# Patient Record
Sex: FEMALE | Race: BLACK OR AFRICAN AMERICAN | NOT HISPANIC OR LATINO | Employment: FULL TIME | URBAN - METROPOLITAN AREA
[De-identification: names, ages, dates, MRNs, and addresses within clinical notes are randomized per-mention and may not be internally consistent; named-entity substitution may affect disease eponyms.]

---

## 2022-12-29 ENCOUNTER — HOSPITAL ENCOUNTER (OUTPATIENT)
Dept: DATA CONVERSION | Facility: HOSPITAL | Age: 22
Discharge: HOME | End: 2022-12-29
Attending: STUDENT IN AN ORGANIZED HEALTH CARE EDUCATION/TRAINING PROGRAM

## 2022-12-29 DIAGNOSIS — Z3A.09 9 WEEKS GESTATION OF PREGNANCY (HHS-HCC): ICD-10-CM

## 2022-12-29 DIAGNOSIS — R11.2 NAUSEA WITH VOMITING, UNSPECIFIED: ICD-10-CM

## 2022-12-29 DIAGNOSIS — R10.30 LOWER ABDOMINAL PAIN, UNSPECIFIED: ICD-10-CM

## 2022-12-29 DIAGNOSIS — O00.101 RIGHT TUBAL PREGNANCY WITHOUT INTRAUTERINE PREGNANCY (HHS-HCC): ICD-10-CM

## 2022-12-29 DIAGNOSIS — O46.91: ICD-10-CM

## 2022-12-29 DIAGNOSIS — Z20.822 CONTACT WITH AND (SUSPECTED) EXPOSURE TO COVID-19: ICD-10-CM

## 2022-12-29 LAB
ABO GROUP (TYPE) IN BLOOD: NORMAL
ACTIVATED PARTIAL THROMBOPLASTIN TIME IN PPP BY COAGULATION ASSAY: 26 SEC (ref 26–39)
ALANINE AMINOTRANSFERASE (SGPT) (U/L) IN SER/PLAS: 5 U/L (ref 7–45)
ALANINE AMINOTRANSFERASE (SGPT) (U/L) IN SER/PLAS: 6 U/L (ref 7–45)
ALBUMIN (G/DL) IN SER/PLAS: 3.5 G/DL (ref 3.4–5)
ALBUMIN (G/DL) IN SER/PLAS: 4.4 G/DL (ref 3.4–5)
ALBUMIN (G/DL) IN SER/PLAS: 4.4 G/DL (ref 3.4–5)
ALKALINE PHOSPHATASE (U/L) IN SER/PLAS: 62 U/L (ref 33–110)
ALKALINE PHOSPHATASE (U/L) IN SER/PLAS: 74 U/L (ref 33–110)
ANION GAP IN SER/PLAS: 12 MMOL/L (ref 10–20)
ANION GAP IN SER/PLAS: 14 MMOL/L (ref 10–20)
ANTIBODY SCREEN: NORMAL
ASPARTATE AMINOTRANSFERASE (SGOT) (U/L) IN SER/PLAS: 13 U/L (ref 9–39)
ASPARTATE AMINOTRANSFERASE (SGOT) (U/L) IN SER/PLAS: 17 U/L (ref 9–39)
BASOPHILS (10*3/UL) IN BLOOD BY AUTOMATED COUNT: 0.05 X10E9/L (ref 0–0.1)
BASOPHILS/100 LEUKOCYTES IN BLOOD BY AUTOMATED COUNT: 0.3 % (ref 0–2)
BILIRUBIN DIRECT (MG/DL) IN SER/PLAS: 0.1 MG/DL (ref 0–0.3)
BILIRUBIN TOTAL (MG/DL) IN SER/PLAS: 0.8 MG/DL (ref 0–1.2)
BILIRUBIN TOTAL (MG/DL) IN SER/PLAS: 1.1 MG/DL (ref 0–1.2)
CALCIUM (MG/DL) IN SER/PLAS: 8 MG/DL (ref 8.6–10.6)
CALCIUM (MG/DL) IN SER/PLAS: 9.1 MG/DL (ref 8.6–10.6)
CARBON DIOXIDE, TOTAL (MMOL/L) IN SER/PLAS: 20 MMOL/L (ref 21–32)
CARBON DIOXIDE, TOTAL (MMOL/L) IN SER/PLAS: 20 MMOL/L (ref 21–32)
CHLORIDE (MMOL/L) IN SER/PLAS: 106 MMOL/L (ref 98–107)
CHLORIDE (MMOL/L) IN SER/PLAS: 107 MMOL/L (ref 98–107)
CHORIOGONADOTROPIN (MIU/ML) IN SER/PLAS: 1934 IU/L
CREATININE (MG/DL) IN SER/PLAS: 0.66 MG/DL (ref 0.5–1.05)
CREATININE (MG/DL) IN SER/PLAS: 0.69 MG/DL (ref 0.5–1.05)
EOSINOPHILS (10*3/UL) IN BLOOD BY AUTOMATED COUNT: 0 X10E9/L (ref 0–0.7)
EOSINOPHILS/100 LEUKOCYTES IN BLOOD BY AUTOMATED COUNT: 0 % (ref 0–6)
ERYTHROCYTE DISTRIBUTION WIDTH (RATIO) BY AUTOMATED COUNT: 12.6 % (ref 11.5–14.5)
ERYTHROCYTE DISTRIBUTION WIDTH (RATIO) BY AUTOMATED COUNT: 12.7 % (ref 11.5–14.5)
ERYTHROCYTE MEAN CORPUSCULAR HEMOGLOBIN CONCENTRATION (G/DL) BY AUTOMATED: 32.7 G/DL (ref 32–36)
ERYTHROCYTE MEAN CORPUSCULAR HEMOGLOBIN CONCENTRATION (G/DL) BY AUTOMATED: 33.6 G/DL (ref 32–36)
ERYTHROCYTE MEAN CORPUSCULAR VOLUME (FL) BY AUTOMATED COUNT: 90 FL (ref 80–100)
ERYTHROCYTE MEAN CORPUSCULAR VOLUME (FL) BY AUTOMATED COUNT: 91 FL (ref 80–100)
ERYTHROCYTES (10*6/UL) IN BLOOD BY AUTOMATED COUNT: 3.45 X10E12/L (ref 4–5.2)
ERYTHROCYTES (10*6/UL) IN BLOOD BY AUTOMATED COUNT: 3.74 X10E12/L (ref 4–5.2)
FLU A RESULT: NOT DETECTED
FLU B RESULT: NOT DETECTED
GFR FEMALE: >90 ML/MIN/1.73M2
GFR FEMALE: >90 ML/MIN/1.73M2
GLUCOSE (MG/DL) IN SER/PLAS: 149 MG/DL (ref 74–99)
GLUCOSE (MG/DL) IN SER/PLAS: 166 MG/DL (ref 74–99)
HEMATOCRIT (%) IN BLOOD BY AUTOMATED COUNT: 31.5 % (ref 36–46)
HEMATOCRIT (%) IN BLOOD BY AUTOMATED COUNT: 33.6 % (ref 36–46)
HEMOGLOBIN (G/DL) IN BLOOD: 10.3 G/DL (ref 12–16)
HEMOGLOBIN (G/DL) IN BLOOD: 11.3 G/DL (ref 12–16)
IMMATURE GRANULOCYTES/100 LEUKOCYTES IN BLOOD BY AUTOMATED COUNT: 0.4 % (ref 0–0.9)
INR IN PPP BY COAGULATION ASSAY: 1.1 (ref 0.9–1.1)
LEUKOCYTES (10*3/UL) IN BLOOD BY AUTOMATED COUNT: 19.5 X10E9/L (ref 4.4–11.3)
LEUKOCYTES (10*3/UL) IN BLOOD BY AUTOMATED COUNT: 20.7 X10E9/L (ref 4.4–11.3)
LYMPHOCYTES (10*3/UL) IN BLOOD BY AUTOMATED COUNT: 0.71 X10E9/L (ref 1.2–4.8)
LYMPHOCYTES/100 LEUKOCYTES IN BLOOD BY AUTOMATED COUNT: 3.6 % (ref 13–44)
MONOCYTES (10*3/UL) IN BLOOD BY AUTOMATED COUNT: 0.69 X10E9/L (ref 0.1–1)
MONOCYTES/100 LEUKOCYTES IN BLOOD BY AUTOMATED COUNT: 3.5 % (ref 2–10)
NEUTROPHILS (10*3/UL) IN BLOOD BY AUTOMATED COUNT: 17.99 X10E9/L (ref 1.2–7.7)
NEUTROPHILS/100 LEUKOCYTES IN BLOOD BY AUTOMATED COUNT: 92.2 % (ref 40–80)
NRBC (PER 100 WBCS) BY AUTOMATED COUNT: 0 /100 WBC (ref 0–0)
NRBC (PER 100 WBCS) BY AUTOMATED COUNT: 0 /100 WBC (ref 0–0)
PHOSPHATE (MG/DL) IN SER/PLAS: 4.2 MG/DL (ref 2.5–4.9)
PLATELETS (10*3/UL) IN BLOOD AUTOMATED COUNT: 238 X10E9/L (ref 150–450)
PLATELETS (10*3/UL) IN BLOOD AUTOMATED COUNT: 322 X10E9/L (ref 150–450)
POTASSIUM (MMOL/L) IN SER/PLAS: 4.7 MMOL/L (ref 3.5–5.3)
POTASSIUM (MMOL/L) IN SER/PLAS: 4.9 MMOL/L (ref 3.5–5.3)
PROTEIN TOTAL: 5.5 G/DL (ref 6.4–8.2)
PROTEIN TOTAL: 7.3 G/DL (ref 6.4–8.2)
PROTHROMBIN TIME (PT) IN PPP BY COAGULATION ASSAY: 13.2 SEC (ref 9.8–13.4)
RH FACTOR: NORMAL
SARS-COV-2 RESULT: NOT DETECTED
SODIUM (MMOL/L) IN SER/PLAS: 134 MMOL/L (ref 136–145)
SODIUM (MMOL/L) IN SER/PLAS: 135 MMOL/L (ref 136–145)
UREA NITROGEN (MG/DL) IN SER/PLAS: 11 MG/DL (ref 6–23)
UREA NITROGEN (MG/DL) IN SER/PLAS: 9 MG/DL (ref 6–23)

## 2022-12-30 LAB
COMPLETE PATHOLOGY REPORT: NORMAL
CONVERTED CLINICAL DIAGNOSIS-HISTORY: NORMAL
CONVERTED FINAL DIAGNOSIS: NORMAL
CONVERTED FINAL REPORT PDF LINK TO COPY AND PASTE: NORMAL
CONVERTED GROSS DESCRIPTION: NORMAL
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
## 2023-05-10 LAB
CHLAMYDIA TRACH., AMPLIFIED: NEGATIVE
N. GONORRHEA, AMPLIFIED: NEGATIVE
URINE CULTURE: NORMAL

## 2023-05-14 ENCOUNTER — HOSPITAL ENCOUNTER (OUTPATIENT)
Dept: DATA CONVERSION | Facility: HOSPITAL | Age: 23
End: 2023-05-15
Attending: EMERGENCY MEDICINE
Payer: MEDICAID

## 2023-05-14 DIAGNOSIS — R10.31 RIGHT LOWER QUADRANT PAIN: ICD-10-CM

## 2023-05-14 DIAGNOSIS — O26.851 SPOTTING COMPLICATING PREGNANCY, FIRST TRIMESTER (HHS-HCC): ICD-10-CM

## 2023-05-14 DIAGNOSIS — O00.80 OTHER ECTOPIC PREGNANCY WITHOUT INTRAUTERINE PREGNANCY (HHS-HCC): ICD-10-CM

## 2023-05-14 DIAGNOSIS — Z87.59 PERSONAL HISTORY OF OTHER COMPLICATIONS OF PREGNANCY, CHILDBIRTH AND THE PUERPERIUM: ICD-10-CM

## 2023-05-14 DIAGNOSIS — Z90.721 ACQUIRED ABSENCE OF OVARIES, UNILATERAL: ICD-10-CM

## 2023-05-14 DIAGNOSIS — O26.891 OTHER SPECIFIED PREGNANCY RELATED CONDITIONS, FIRST TRIMESTER (HHS-HCC): ICD-10-CM

## 2023-05-14 DIAGNOSIS — O20.9 HEMORRHAGE IN EARLY PREGNANCY, UNSPECIFIED (HHS-HCC): ICD-10-CM

## 2023-05-14 DIAGNOSIS — Z87.42 PERSONAL HISTORY OF OTHER DISEASES OF THE FEMALE GENITAL TRACT: ICD-10-CM

## 2023-05-14 LAB
ABO GROUP (TYPE) IN BLOOD: NORMAL
ACTIVATED PARTIAL THROMBOPLASTIN TIME IN PPP BY COAGULATION ASSAY: NORMAL
ALANINE AMINOTRANSFERASE (SGPT) (U/L) IN SER/PLAS: 6 U/L (ref 7–45)
ALBUMIN (G/DL) IN SER/PLAS: 4.2 G/DL (ref 3.4–5)
ALKALINE PHOSPHATASE (U/L) IN SER/PLAS: 74 U/L (ref 33–110)
ANION GAP IN SER/PLAS: 14 MMOL/L (ref 10–20)
ANTIBODY SCREEN: NORMAL
APPEARANCE, URINE: ABNORMAL
ASPARTATE AMINOTRANSFERASE (SGOT) (U/L) IN SER/PLAS: 23 U/L (ref 9–39)
BASOPHILS (10*3/UL) IN BLOOD BY AUTOMATED COUNT: 0.03 X10E9/L (ref 0–0.1)
BASOPHILS/100 LEUKOCYTES IN BLOOD BY AUTOMATED COUNT: 0.3 % (ref 0–2)
BILIRUBIN TOTAL (MG/DL) IN SER/PLAS: 0.5 MG/DL (ref 0–1.2)
BILIRUBIN, URINE: NEGATIVE
BLOOD, URINE: ABNORMAL
CALCIUM (MG/DL) IN SER/PLAS: 9.2 MG/DL (ref 8.6–10.6)
CARBON DIOXIDE, TOTAL (MMOL/L) IN SER/PLAS: 19 MMOL/L (ref 21–32)
CHLORIDE (MMOL/L) IN SER/PLAS: 108 MMOL/L (ref 98–107)
CHORIOGONADOTROPIN (MIU/ML) IN SER/PLAS: 6207 IU/L
CLUE CELLS WET PREP: NORMAL
COLOR, URINE: YELLOW
CREATININE (MG/DL) IN SER/PLAS: 0.72 MG/DL (ref 0.5–1.05)
EOSINOPHILS (10*3/UL) IN BLOOD BY AUTOMATED COUNT: 0.06 X10E9/L (ref 0–0.7)
EOSINOPHILS/100 LEUKOCYTES IN BLOOD BY AUTOMATED COUNT: 0.6 % (ref 0–6)
ERYTHROCYTE DISTRIBUTION WIDTH (RATIO) BY AUTOMATED COUNT: 16 % (ref 11.5–14.5)
ERYTHROCYTE MEAN CORPUSCULAR HEMOGLOBIN CONCENTRATION (G/DL) BY AUTOMATED: 32.1 G/DL (ref 32–36)
ERYTHROCYTE MEAN CORPUSCULAR VOLUME (FL) BY AUTOMATED COUNT: 87 FL (ref 80–100)
ERYTHROCYTES (10*6/UL) IN BLOOD BY AUTOMATED COUNT: 4.01 X10E12/L (ref 4–5.2)
GFR FEMALE: >90 ML/MIN/1.73M2
GLUCOSE (MG/DL) IN SER/PLAS: 79 MG/DL (ref 74–99)
GLUCOSE, URINE: NEGATIVE MG/DL
HEMATOCRIT (%) IN BLOOD BY AUTOMATED COUNT: 34.9 % (ref 36–46)
HEMOGLOBIN (G/DL) IN BLOOD: 11.2 G/DL (ref 12–16)
HIV 1/ 2 AG/AB SCREEN: NONREACTIVE
IMMATURE GRANULOCYTES/100 LEUKOCYTES IN BLOOD BY AUTOMATED COUNT: 0.3 % (ref 0–0.9)
INR IN PPP BY COAGULATION ASSAY: NORMAL
KETONES, URINE: NEGATIVE MG/DL
LEUKOCYTE ESTERASE, URINE: NEGATIVE
LEUKOCYTES (10*3/UL) IN BLOOD BY AUTOMATED COUNT: 9.9 X10E9/L (ref 4.4–11.3)
LYMPHOCYTES (10*3/UL) IN BLOOD BY AUTOMATED COUNT: 0.99 X10E9/L (ref 1.2–4.8)
LYMPHOCYTES/100 LEUKOCYTES IN BLOOD BY AUTOMATED COUNT: 10 % (ref 13–44)
MONOCYTES (10*3/UL) IN BLOOD BY AUTOMATED COUNT: 0.61 X10E9/L (ref 0.1–1)
MONOCYTES/100 LEUKOCYTES IN BLOOD BY AUTOMATED COUNT: 6.2 % (ref 2–10)
MUCUS, URINE: NORMAL /LPF
NEUTROPHILS (10*3/UL) IN BLOOD BY AUTOMATED COUNT: 8.14 X10E9/L (ref 1.2–7.7)
NEUTROPHILS/100 LEUKOCYTES IN BLOOD BY AUTOMATED COUNT: 82.6 % (ref 40–80)
NITRITE, URINE: NEGATIVE
NRBC (PER 100 WBCS) BY AUTOMATED COUNT: 0 /100 WBC (ref 0–0)
PH, URINE: 5 (ref 5–8)
PLATELETS (10*3/UL) IN BLOOD AUTOMATED COUNT: 314 X10E9/L (ref 150–450)
POTASSIUM (MMOL/L) IN SER/PLAS: 5 MMOL/L (ref 3.5–5.3)
PROTEIN TOTAL: 7 G/DL (ref 6.4–8.2)
PROTEIN, URINE: NEGATIVE MG/DL
PROTHROMBIN TIME (PT) IN PPP BY COAGULATION ASSAY: NORMAL
RBC, URINE: 2 /HPF (ref 0–5)
RH FACTOR: NORMAL
SARS-COV-2 RESULT: NORMAL
SODIUM (MMOL/L) IN SER/PLAS: 136 MMOL/L (ref 136–145)
SPECIFIC GRAVITY, URINE: 1.02 (ref 1–1.03)
SQUAMOUS EPITHELIAL CELLS, URINE: 4 /HPF
TRICH WET PREP: NORMAL
TRICHOMONAS REFLEX COMMENT: NORMAL
UREA NITROGEN (MG/DL) IN SER/PLAS: 10 MG/DL (ref 6–23)
UROBILINOGEN, URINE: <2 MG/DL (ref 0–1.9)
WBC WET PREP: NORMAL /HPF
WBC, URINE: 1 /HPF (ref 0–5)
YEAST PRESENCE WET PREP: NORMAL

## 2023-05-15 LAB
CHLAMYDIA TRACH., AMPLIFIED: NEGATIVE
N. GONORRHEA, AMPLIFIED: NEGATIVE
SYPHILIS TOTAL AB: NONREACTIVE
TRICHOMONAS VAGINALIS: NEGATIVE

## 2023-08-21 LAB
ABO GROUP (TYPE) IN BLOOD: NORMAL
ANTIBODY SCREEN: NORMAL
ERYTHROCYTE DISTRIBUTION WIDTH (RATIO) BY AUTOMATED COUNT: 13.5 % (ref 11.5–14.5)
ERYTHROCYTE MEAN CORPUSCULAR HEMOGLOBIN CONCENTRATION (G/DL) BY AUTOMATED: 32.2 G/DL (ref 32–36)
ERYTHROCYTE MEAN CORPUSCULAR VOLUME (FL) BY AUTOMATED COUNT: 91 FL (ref 80–100)
ERYTHROCYTES (10*6/UL) IN BLOOD BY AUTOMATED COUNT: 4.55 X10E12/L (ref 4–5.2)
HEMATOCRIT (%) IN BLOOD BY AUTOMATED COUNT: 41.3 % (ref 36–46)
HEMOGLOBIN (G/DL) IN BLOOD: 13.3 G/DL (ref 12–16)
HEPATITIS B VIRUS SURFACE AG PRESENCE IN SERUM: NONREACTIVE
HEPATITIS C VIRUS AB PRESENCE IN SERUM: NONREACTIVE
HIV 1/ 2 AG/AB SCREEN: NONREACTIVE
LEUKOCYTES (10*3/UL) IN BLOOD BY AUTOMATED COUNT: 12.4 X10E9/L (ref 4.4–11.3)
NRBC (PER 100 WBCS) BY AUTOMATED COUNT: 0 /100 WBC (ref 0–0)
PLATELETS (10*3/UL) IN BLOOD AUTOMATED COUNT: 341 X10E9/L (ref 150–450)
REFLEX ADDED, ANEMIA PANEL: ABNORMAL
RH FACTOR: NORMAL
RUBELLA VIRUS IGG AB: POSITIVE
SYPHILIS TOTAL AB: NONREACTIVE

## 2023-08-22 LAB
CHLAMYDIA TRACH., AMPLIFIED: NEGATIVE
N. GONORRHEA, AMPLIFIED: NEGATIVE
URINE CULTURE: NO GROWTH

## 2023-09-07 VITALS
WEIGHT: 116.18 LBS | DIASTOLIC BLOOD PRESSURE: 55 MMHG | RESPIRATION RATE: 18 BRPM | BODY MASS INDEX: 22.81 KG/M2 | TEMPERATURE: 98.1 F | HEART RATE: 71 BPM | SYSTOLIC BLOOD PRESSURE: 98 MMHG | HEIGHT: 60 IN | OXYGEN SATURATION: 100 %
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
## 2023-10-02 NOTE — OP NOTE
Post Operative Note:     PreOp Diagnosis: Ectopic pregnancy   Post-Procedure Diagnosis: Cornual ectopic pregnancy   Procedure: Laparoscopic right wedge resection of  cornual pregnancy   Surgeon: Ingram   Resident/Fellow/Other Assistant: Sybil   Anesthesia: GET   I.V. Fluids: 2300cc crystalloid   Estimated Blood Loss (mL): 300cc   Specimen: yes. Right uterine cornual ectopic pregnancy   Findings: Large hyperemic right cornual ectopic pregnancy  with thin wall, impending rupture, about 5x5cm, extending down the right side of the uterus toward the uterine vessel branches.   Urine Output: 150cc   Additional Details: We recommend that the patient  should not labor in the future given myometrial defect, should have  with all future pregnancies.     Operative Report Dictated:  Dictation: not applicable - note contains Operative  Report   Operative Report:    Patient was consented and taken to the operating room and placed under general anesthesia.  She was prepared and draped in normal sterile fashion.  Timeout was  performed.  Ancef was given.  We made an incision superior to the umbilicus about 10mm.  The abdominal wall was very thin suspicious for hernia and we entered the peritoneal cavity directly with gentle pressure from a hemostat.  The fascia was very attenuated.   We insufflated and confirmed no injury on entry.  We placed patient in trendelenburg and placed three 5mm trocars at LLQ, RLQ and suprapubic.  We performed the survey with findings as above.  We then injected the myometrium of the uterine cornua circumferentially  around the ectopic pregnancy with lido with epi to help with hemostasis.  We then used ligasure to bipolar the tissue circumferentially around the pregnancy.  There was some significant bleeding at the right inferior aspect of this, near the branches  of the uterine vessels as they coursed up the uterus.  We proceeded to completely remove the ectopic pregnancy and uterine cornua  with ligasure.  We closed the incision in 3 layers with 0-Vloc suture.  First two layers were myometrium and last layer was  serosa.  We irrigated and suctioned out the pelvis and cleared all blood.  There was good hemostasis.  All counts were corrects, patient tolerated procedure well.    Anuel Ingram MD    Attestation:   Note Completion:  Attending Attestation I was present for the entire procedure         Electronic Signatures:  Anuel Ingram (MD (Fellow))  (Signed 14-May-2023 22:25)   Authored: Post Operative Note, Note Completion      Last Updated: 14-May-2023 22:25 by Anuel Ingram (MD (Fellow))

## 2023-10-06 PROBLEM — Z87.59 STATUS POST ECTOPIC PREGNANCY: Status: ACTIVE | Noted: 2023-10-06

## 2023-10-06 PROBLEM — N83.209 SIMPLE OVARIAN CYST: Status: ACTIVE | Noted: 2023-10-06

## 2023-10-06 PROBLEM — O09.91 HIGH-RISK PREGNANCY IN FIRST TRIMESTER (HHS-HCC): Status: ACTIVE | Noted: 2023-10-06

## 2023-10-06 PROBLEM — J00 COMMON COLD: Status: ACTIVE | Noted: 2023-10-06

## 2023-10-06 PROBLEM — E04.9 GOITER: Status: ACTIVE | Noted: 2023-10-06

## 2023-10-06 PROBLEM — K59.00 CONSTIPATION: Status: ACTIVE | Noted: 2023-10-06

## 2023-10-06 RX ORDER — OXYCODONE HYDROCHLORIDE 5 MG/1
TABLET ORAL
COMMUNITY
Start: 2022-12-29 | End: 2023-10-09 | Stop reason: ALTCHOICE

## 2023-10-06 RX ORDER — ACETAMINOPHEN 325 MG/1
TABLET, FILM COATED ORAL
COMMUNITY
Start: 2022-12-29 | End: 2023-11-06 | Stop reason: ALTCHOICE

## 2023-10-06 RX ORDER — ONDANSETRON 4 MG/1
TABLET, FILM COATED ORAL
COMMUNITY
Start: 2023-05-14 | End: 2023-10-09 | Stop reason: ALTCHOICE

## 2023-10-06 RX ORDER — IBUPROFEN 800 MG/1
TABLET ORAL
COMMUNITY
Start: 2023-05-14 | End: 2023-10-09 | Stop reason: ALTCHOICE

## 2023-10-06 RX ORDER — IBUPROFEN 600 MG/1
TABLET ORAL
COMMUNITY
Start: 2019-11-06 | End: 2023-10-09 | Stop reason: ALTCHOICE

## 2023-10-06 RX ORDER — PNV NO.95/FERROUS FUM/FOLIC AC 28MG-0.8MG
TABLET ORAL
COMMUNITY
Start: 2023-05-09 | End: 2024-03-18 | Stop reason: HOSPADM

## 2023-10-06 RX ORDER — MEDROXYPROGESTERONE ACETATE 150 MG/ML
INJECTION, SUSPENSION INTRAMUSCULAR
COMMUNITY
End: 2023-10-09

## 2023-10-06 RX ORDER — ONDANSETRON 4 MG/1
TABLET, ORALLY DISINTEGRATING ORAL
COMMUNITY
Start: 2023-05-09 | End: 2023-12-04 | Stop reason: HOSPADM

## 2023-10-06 RX ORDER — DOCUSATE SODIUM 100 MG/1
CAPSULE, LIQUID FILLED ORAL
COMMUNITY
Start: 2023-05-09 | End: 2024-02-29 | Stop reason: WASHOUT

## 2023-10-09 ENCOUNTER — CLINICAL SUPPORT (OUTPATIENT)
Dept: OBSTETRICS AND GYNECOLOGY | Facility: CLINIC | Age: 23
End: 2023-10-09
Payer: MEDICAID

## 2023-10-09 ENCOUNTER — ANCILLARY PROCEDURE (OUTPATIENT)
Dept: RADIOLOGY | Facility: CLINIC | Age: 23
End: 2023-10-09
Payer: MEDICAID

## 2023-10-09 VITALS — SYSTOLIC BLOOD PRESSURE: 134 MMHG | BODY MASS INDEX: 24.94 KG/M2 | DIASTOLIC BLOOD PRESSURE: 90 MMHG | WEIGHT: 132 LBS

## 2023-10-09 DIAGNOSIS — Z36.82 ENCOUNTER FOR ANTENATAL SCREENING FOR NUCHAL TRANSLUCENCY (HHS-HCC): ICD-10-CM

## 2023-10-09 PROCEDURE — 76813 OB US NUCHAL MEAS 1 GEST: CPT

## 2023-10-09 PROCEDURE — 76813 OB US NUCHAL MEAS 1 GEST: CPT | Performed by: OBSTETRICS & GYNECOLOGY

## 2023-10-09 ASSESSMENT — ENCOUNTER SYMPTOMS
ALLERGIC/IMMUNOLOGIC NEGATIVE: 0
RESPIRATORY NEGATIVE: 0
EYES NEGATIVE: 0
NEUROLOGICAL NEGATIVE: 0
GASTROINTESTINAL NEGATIVE: 0
CONSTITUTIONAL NEGATIVE: 0
PSYCHIATRIC NEGATIVE: 0
ENDOCRINE NEGATIVE: 0
CARDIOVASCULAR NEGATIVE: 0
MUSCULOSKELETAL NEGATIVE: 0
HEMATOLOGIC/LYMPHATIC NEGATIVE: 0

## 2023-10-11 DIAGNOSIS — Z34.90 PREGNANCY, UNSPECIFIED GESTATIONAL AGE (HHS-HCC): Primary | ICD-10-CM

## 2023-10-11 NOTE — TELEPHONE ENCOUNTER
Call from patient requesting refills on PNV  Original prescription 9/5/23 ordered without refills.  Pend and Send order to Dr Chopra.

## 2023-10-14 ENCOUNTER — HOSPITAL ENCOUNTER (EMERGENCY)
Facility: HOSPITAL | Age: 23
Discharge: OTHER INSTITUTION | End: 2023-10-14
Attending: STUDENT IN AN ORGANIZED HEALTH CARE EDUCATION/TRAINING PROGRAM
Payer: MEDICAID

## 2023-10-14 VITALS
DIASTOLIC BLOOD PRESSURE: 73 MMHG | BODY MASS INDEX: 25.91 KG/M2 | RESPIRATION RATE: 16 BRPM | OXYGEN SATURATION: 100 % | HEIGHT: 60 IN | SYSTOLIC BLOOD PRESSURE: 118 MMHG | WEIGHT: 132 LBS | TEMPERATURE: 98.5 F | HEART RATE: 84 BPM

## 2023-10-14 DIAGNOSIS — O46.90 VAGINAL BLEEDING DURING PREGNANCY (HHS-HCC): Primary | ICD-10-CM

## 2023-10-14 LAB
APPEARANCE UR: ABNORMAL
B-HCG SERPL-ACNC: ABNORMAL MIU/ML
BASOPHILS # BLD AUTO: 0.04 X10*3/UL (ref 0–0.1)
BASOPHILS NFR BLD AUTO: 0.3 %
BILIRUB UR STRIP.AUTO-MCNC: NEGATIVE MG/DL
COLOR UR: YELLOW
EOSINOPHIL # BLD AUTO: 0.17 X10*3/UL (ref 0–0.7)
EOSINOPHIL NFR BLD AUTO: 1.3 %
ERYTHROCYTE [DISTWIDTH] IN BLOOD BY AUTOMATED COUNT: 13.2 % (ref 11.5–14.5)
GLUCOSE UR STRIP.AUTO-MCNC: NEGATIVE MG/DL
HCT VFR BLD AUTO: 35.1 % (ref 36–46)
HGB BLD-MCNC: 11.9 G/DL (ref 12–16)
IMM GRANULOCYTES # BLD AUTO: 0.07 X10*3/UL (ref 0–0.7)
IMM GRANULOCYTES NFR BLD AUTO: 0.5 % (ref 0–0.9)
KETONES UR STRIP.AUTO-MCNC: NEGATIVE MG/DL
LEUKOCYTE ESTERASE UR QL STRIP.AUTO: NEGATIVE
LYMPHOCYTES # BLD AUTO: 1.37 X10*3/UL (ref 1.2–4.8)
LYMPHOCYTES NFR BLD AUTO: 10.7 %
MCH RBC QN AUTO: 30.9 PG (ref 26–34)
MCHC RBC AUTO-ENTMCNC: 33.9 G/DL (ref 32–36)
MCV RBC AUTO: 91 FL (ref 80–100)
MONOCYTES # BLD AUTO: 0.62 X10*3/UL (ref 0.1–1)
MONOCYTES NFR BLD AUTO: 4.8 %
NEUTROPHILS # BLD AUTO: 10.54 X10*3/UL (ref 1.2–7.7)
NEUTROPHILS NFR BLD AUTO: 82.4 %
NITRITE UR QL STRIP.AUTO: NEGATIVE
NRBC BLD-RTO: 0 /100 WBCS (ref 0–0)
PH UR STRIP.AUTO: 7 [PH]
PLATELET # BLD AUTO: 292 X10*3/UL (ref 150–450)
PMV BLD AUTO: 9.3 FL (ref 7.5–11.5)
PROT UR STRIP.AUTO-MCNC: NEGATIVE MG/DL
RBC # BLD AUTO: 3.85 X10*6/UL (ref 4–5.2)
RBC # UR STRIP.AUTO: ABNORMAL /UL
RBC #/AREA URNS AUTO: NORMAL /HPF
SP GR UR STRIP.AUTO: 1.01
SQUAMOUS #/AREA URNS AUTO: NORMAL /HPF
UROBILINOGEN UR STRIP.AUTO-MCNC: <2 MG/DL
WBC # BLD AUTO: 12.8 X10*3/UL (ref 4.4–11.3)
WBC #/AREA URNS AUTO: NORMAL /HPF

## 2023-10-14 PROCEDURE — 85025 COMPLETE CBC W/AUTO DIFF WBC: CPT

## 2023-10-14 PROCEDURE — 99283 EMERGENCY DEPT VISIT LOW MDM: CPT

## 2023-10-14 PROCEDURE — 36415 COLL VENOUS BLD VENIPUNCTURE: CPT | Mod: CMCLAB

## 2023-10-14 PROCEDURE — 81001 URINALYSIS AUTO W/SCOPE: CPT

## 2023-10-14 PROCEDURE — 84702 CHORIONIC GONADOTROPIN TEST: CPT | Mod: CMCLAB

## 2023-10-14 PROCEDURE — 99284 EMERGENCY DEPT VISIT MOD MDM: CPT | Performed by: STUDENT IN AN ORGANIZED HEALTH CARE EDUCATION/TRAINING PROGRAM

## 2023-10-14 ASSESSMENT — COLUMBIA-SUICIDE SEVERITY RATING SCALE - C-SSRS
1. IN THE PAST MONTH, HAVE YOU WISHED YOU WERE DEAD OR WISHED YOU COULD GO TO SLEEP AND NOT WAKE UP?: NO
2. HAVE YOU ACTUALLY HAD ANY THOUGHTS OF KILLING YOURSELF?: NO
6. HAVE YOU EVER DONE ANYTHING, STARTED TO DO ANYTHING, OR PREPARED TO DO ANYTHING TO END YOUR LIFE?: NO

## 2023-10-14 NOTE — ED PROVIDER NOTES
Emergency Department Encounter  [facility] EMERGENCY MEDICINE    Patient: Dot Casey  MRN: [id number]  : 2000  Date of Evaluation: 10/14/2023  ED Provider: ETHAN Edge-BREANNA      Chief Complaint       Chief Complaint   Patient presents with    Vaginal Bleeding - Pregnant     [place]    (Location/Symptom, Timing/Onset, Context/Setting, Quality, Duration, Modifying Factors, Severity) Note limiting factors.   Limitations to History: None  Historian: Patient and mother  Records reviewed: EMR inpatient and outpatient notes, Care Everywhere      Dot Casey is a G5,P2, 14 wk OB, 22 y.o. female who presents to the emergency department complaining of vaginal bleeding for since this morning.  She reports smeared blood on toilet paper when wiping.  She reports used 1 pad with scant bleeding.  She reports receiving prenatal care at  OB/GYN department. she denies traumatic injury, passing clots, urinary symptoms, pain with urination, hematuria, concern for STDs, CVA tenderness, abdominal pain, nausea/vomiting, chest pain or shortness of breath.    ROS:     Review of Systems  14 systems reviewed and otherwise acutely negative except as in the [place].          Past History     Past Medical History:   Diagnosis Date    Encounter for initial prescription of injectable contraceptive 2018    Initiation of Depo Provera    Encounter for pregnancy test, result negative 2018    Pregnancy test negative    Encounter for routine postpartum follow-up 2018    Postpartum examination following vaginal delivery    Nontoxic goiter, unspecified 2018    Goiter    Other specified health status     No pertinent past surgical history    Other specified health status     No pertinent past medical history    Unspecified ovarian cyst, unspecified side 2018    Simple ovarian cyst     Past Surgical History:   Procedure Laterality Date    ECTOPIC PREGNANCY SURGERY Right 2023     laparoscopic resection of rt cornual pregnancy    LAPAROSCOPY DIAGNOSTIC / BIOPSY / ASPIRATION / LYSIS  12/29/2022    ruptured ectopic evacuation of rt teresa,    OTHER SURGICAL HISTORY  08/18/2016    Ovarian Cystectomy Left    SALPINGECTOMY Right 07/20/2018    Right fallopian tube and ovary, ov cyst     Social History     Socioeconomic History    Marital status: Single     Spouse name: None    Number of children: None    Years of education: None    Highest education level: None   Occupational History    None   Tobacco Use    Smoking status: Former     Types: Cigarettes    Smokeless tobacco: Never   Substance and Sexual Activity    Alcohol use: Not Currently     Comment: prior hx 1 btl wine 2-3 x/wk, 2 btls-wkend    Drug use: Not Currently     Types: Marijuana     Comment: stopped 7/202    Sexual activity: Yes   Other Topics Concern    None   Social History Narrative    None     Social Determinants of Health     Financial Resource Strain: Not on file   Food Insecurity: Not on file   Transportation Needs: Not on file   Physical Activity: Not on file   Stress: Not on file   Social Connections: Not on file   Intimate Partner Violence: Not on file       Medications/Allergies     Previous Medications    DOCUSATE SODIUM (COLACE) 100 MG CAPSULE    TAKE 1 CAPSULE BY MOUTH TWICE DAILY AS NEEDED    ONDANSETRON ODT (ZOFRAN-ODT) 4 MG DISINTEGRATING TABLET    DISSOLVE 1 TO 2 TABLETS ON THE TONGUE EVERY 8 HOURS AS NEEDED FOR NAUSEA    PRENATAL 28 MG IRON- 800 MCG TABLET    TAKE ONE TABET BY MOUTH ONCE DAILY    PRENATAL VITAMIN, IRON-FOLIC, (PRENATAL VIT NO.130-IRON-FOLIC) 27 MG IRON-800 MCG FOLIC ACID TABLET    Take 1 tablet by mouth once daily.    TYLENOL 325 MG TABLET    3 tab(s) orally every 6 hours, As Needed     No Known Allergies     Physical Exam       ED Triage Vitals [10/14/23 1535]   Temp Heart Rate Resp BP   36.9 °C (98.5 °F) 84 16 118/73      SpO2 Temp Source Heart Rate Source Patient Position   100 % Temporal  -- Sitting      BP Location FiO2 (%)     Right arm --         Physical Exam  Vitals and nursing note reviewed. Exam conducted with a chaperone present.   Constitutional:       Appearance: Normal appearance.   HENT:      Head: Normocephalic and atraumatic.      Nose: Nose normal.      Mouth/Throat:      Mouth: Mucous membranes are moist.   Eyes:      Extraocular Movements: Extraocular movements intact.      Pupils: Pupils are equal, round, and reactive to light.   Cardiovascular:      Rate and Rhythm: Normal rate and regular rhythm.      Heart sounds: Normal heart sounds.   Pulmonary:      Effort: Pulmonary effort is normal.      Breath sounds: Normal breath sounds.   Abdominal:      General: Abdomen is flat. Bowel sounds are normal. There is no distension.      Palpations: Abdomen is soft. There is no mass.      Tenderness: There is no abdominal tenderness. There is no right CVA tenderness, left CVA tenderness, guarding or rebound.      Hernia: No hernia is present.   Genitourinary:     General: Normal vulva.      Vagina: Vaginal discharge present.      Comments: PELVIC EXAM: Speculum exam shows scant bleeding. No ulcerations or lesions. Bimanual exam shows no adnexal pain or mass. No cervical motion tenderness.  Musculoskeletal:         General: Normal range of motion.      Cervical back: Normal range of motion and neck supple.   Skin:     General: Skin is warm and dry.   Neurological:      General: No focal deficit present.      Mental Status: She is alert and oriented to person, place, and time.   Psychiatric:         Mood and Affect: Mood normal.         Behavior: Behavior normal.             Diagnostics   Labs:  Labs Reviewed - No data to display  Radiographs:  No orders to display       Procedures:       EKG: N/A      Assessment   In brief, Dot Casey is a 22 y.o. female who presented to the emergency department of vaginal bleeding for since this morning.  Vital signs are reviewed and within normal  limits.  Patient appears hydrated, well nourished and nontoxic appearing.  No acute distress noted.  Physical exam unremarkable except for speculum exam shows scant bleeding. No ulcerations or lesions. Bimanual exam shows no adnexal pain or mass. No cervical motion tenderness.  Previous medical records reviewed, Ultrasound on 10/2023 shows intrauterine pregnancy at approximately 13.3 weeks with fetal heart rate at 145 bpm. Type and screen on 2023 showed Rh positive. ED work-up included labs. hCG serum quantitative elevated at 7 2, 850. CBC with differential shows WBCs at 12.8, RBC at 3.85, hemoglobin at 11.9 and hematocrit at 35.1.  UA shows moderate blood but no leukocytosis or infection.  Differential diagnoses include threatened  and ectopic pregnancy.  Patient left prior to treatment being completed.  This provider called patient and informed of lab results and recommended to follow-up with OB/GYN on Monday.  Patient verbalized understanding.  Case reviewed with Dr. Thomas Livingston.  Plan   Vaginal bleeding    Differentials   Vaginal bleeding during pregnancy  Threatened   Ectopic pregnancy    ED Course   Visit Vitals  /73 (BP Location: Right arm, Patient Position: Sitting)   Pulse 84   Temp 36.9 °C (98.5 °F) (Temporal)   Resp 16   Ht 1.524 m (5')   Wt 59.9 kg (132 lb)   LMP 2023 (Exact Date)   SpO2 100%   BMI 25.78 kg/m²   OB Status Pregnant   Smoking Status Former   BSA 1.59 m²       Medications - No data to display    Plan of care discussed with the patient and mother.  Case reviewed with Dr. Thomas Livingston.      Final Impression    Vaginal bleeding during pregnancy    DISPOSITION  Disposition: Discharge  Patient condition is: Stable    Comment: Please note this report has been produced using speech recognition software and may contain errors related to that system including errors in grammar, punctuation, and spelling, as well as words and phrases that may be  inappropriate.  If there are any questions or concerns please feel free to contact the dictating provider for clarification.    Edel Arguello, APRN-CNP     Edel Arguello, APRN-BREANNA  10/14/23 1939

## 2023-10-14 NOTE — ED TRIAGE NOTES
Patient presents to the Emergency department with a chief complaint of vaginal bleeding. Patient is 14 weeks pregnant, states this is a high risk pregnancy.

## 2023-11-06 ENCOUNTER — ROUTINE PRENATAL (OUTPATIENT)
Dept: OBSTETRICS AND GYNECOLOGY | Facility: CLINIC | Age: 23
End: 2023-11-06
Payer: MEDICAID

## 2023-11-06 VITALS — WEIGHT: 138 LBS | BODY MASS INDEX: 26.95 KG/M2 | SYSTOLIC BLOOD PRESSURE: 106 MMHG | DIASTOLIC BLOOD PRESSURE: 67 MMHG

## 2023-11-06 DIAGNOSIS — O34.29 UTERINE SCAR FROM PREVIOUS SURGERY AFFECTING PREGNANCY (HHS-HCC): Primary | ICD-10-CM

## 2023-11-06 DIAGNOSIS — Z3A.17 17 WEEKS GESTATION OF PREGNANCY (HHS-HCC): ICD-10-CM

## 2023-11-06 PROCEDURE — 99213 OFFICE O/P EST LOW 20 MIN: CPT | Mod: GC,TH | Performed by: STUDENT IN AN ORGANIZED HEALTH CARE EDUCATION/TRAINING PROGRAM

## 2023-11-06 PROCEDURE — 99214 OFFICE O/P EST MOD 30 MIN: CPT | Performed by: STUDENT IN AN ORGANIZED HEALTH CARE EDUCATION/TRAINING PROGRAM

## 2023-11-06 PROCEDURE — 99214 OFFICE O/P EST MOD 30 MIN: CPT | Mod: GC,TH | Performed by: STUDENT IN AN ORGANIZED HEALTH CARE EDUCATION/TRAINING PROGRAM

## 2023-11-06 NOTE — PROGRESS NOTES
OB  Follow-up  2023       SUBJECTIVE    HPI: Dot Casey is a 22 y.o.  at 17w3d here for RPNV. Denies contractions, bleeding, or LOF. Reports not yet feeling consistent normal fetal movement. Patient reports she is feeling well    OBJECTIVE    Visit Vitals  /67 Comment: HR 80   Wt 62.6 kg (138 lb)   LMP 2023 (Exact Date)   BMI 26.95 kg/m²   OB Status Pregnant   Smoking Status Former   BSA 1.63 m²      FHT: 145    ASSESSMENT & PLAN    Dot Casey is a 22 y.o.  at 17w3d here for the following concerns we addressed today:    History of right cornual wedge resection for ectopic pregnancy  17 weeks gestation of pregnancy  Doing well. Labs normal.   - anatomy scheduled  - reviewed plan for pre-labor C/S NO LATER THAN 37 w 0 d     RTC in 4 weeks    Patient seen and evaluated with Dr. Rayo Pereira MD, PGY-3

## 2023-11-17 ENCOUNTER — ANCILLARY PROCEDURE (OUTPATIENT)
Dept: RADIOLOGY | Facility: CLINIC | Age: 23
End: 2023-11-17
Payer: MEDICAID

## 2023-11-17 DIAGNOSIS — Z36.3 ENCOUNTER FOR ANTENATAL SCREENING FOR MALFORMATIONS (HHS-HCC): ICD-10-CM

## 2023-11-17 PROCEDURE — 76805 OB US >/= 14 WKS SNGL FETUS: CPT | Performed by: OBSTETRICS & GYNECOLOGY

## 2023-11-17 PROCEDURE — 76805 OB US >/= 14 WKS SNGL FETUS: CPT

## 2023-12-04 ENCOUNTER — ROUTINE PRENATAL (OUTPATIENT)
Dept: OBSTETRICS AND GYNECOLOGY | Facility: CLINIC | Age: 23
End: 2023-12-04
Payer: MEDICAID

## 2023-12-04 VITALS
HEART RATE: 101 BPM | DIASTOLIC BLOOD PRESSURE: 76 MMHG | SYSTOLIC BLOOD PRESSURE: 119 MMHG | WEIGHT: 146 LBS | BODY MASS INDEX: 28.51 KG/M2

## 2023-12-04 DIAGNOSIS — O34.29 UTERINE SCAR FROM PREVIOUS SURGERY AFFECTING PREGNANCY (HHS-HCC): ICD-10-CM

## 2023-12-04 DIAGNOSIS — Z3A.17 17 WEEKS GESTATION OF PREGNANCY (HHS-HCC): ICD-10-CM

## 2023-12-04 DIAGNOSIS — Z3A.21 21 WEEKS GESTATION OF PREGNANCY (HHS-HCC): Primary | ICD-10-CM

## 2023-12-04 PROBLEM — Z98.890 HISTORY OF SURGICAL PROCEDURE: Status: ACTIVE | Noted: 2023-12-04

## 2023-12-04 PROBLEM — O16.9 ELEVATED BLOOD PRESSURE AFFECTING PREGNANCY, ANTEPARTUM (HHS-HCC): Status: ACTIVE | Noted: 2023-12-04

## 2023-12-04 PROBLEM — Z63.4 LOSS OF INFANT: Status: ACTIVE | Noted: 2023-12-04

## 2023-12-04 LAB
CREAT UR-MCNC: 141.2 MG/DL (ref 20–320)
PROT UR-ACNC: 13 MG/DL (ref 5–24)
PROT/CREAT UR: 0.09 MG/MG CREAT (ref 0–0.17)

## 2023-12-04 PROCEDURE — 82570 ASSAY OF URINE CREATININE: CPT | Performed by: STUDENT IN AN ORGANIZED HEALTH CARE EDUCATION/TRAINING PROGRAM

## 2023-12-04 PROCEDURE — 99214 OFFICE O/P EST MOD 30 MIN: CPT | Performed by: STUDENT IN AN ORGANIZED HEALTH CARE EDUCATION/TRAINING PROGRAM

## 2023-12-04 PROCEDURE — 99214 OFFICE O/P EST MOD 30 MIN: CPT | Mod: GC,TH | Performed by: STUDENT IN AN ORGANIZED HEALTH CARE EDUCATION/TRAINING PROGRAM

## 2023-12-04 ASSESSMENT — PAIN SCALES - GENERAL: PAINLEVEL_OUTOF10: 6

## 2023-12-04 ASSESSMENT — PAIN - FUNCTIONAL ASSESSMENT: PAIN_FUNCTIONAL_ASSESSMENT: 0-10

## 2023-12-04 NOTE — PROGRESS NOTES
I saw and evaluated the patient. I personally obtained the key and critical portions of the history and physical exam or was physically present for key and critical portions performed by the resident/fellow. I reviewed the resident/fellow's documentation and discussed the patient with the resident/fellow. I agree with the resident/fellow's medical decision making as documented in the note.    Rowena Cade MD

## 2023-12-04 NOTE — PROGRESS NOTES
Ob Follow-up  2023      SUBJECTIVE      HPI: Dot Casey is a 23 y.o.  at 21w3d here for RPNV.  She has no contractions, bleeding, or LOF. Reports normal fetal movement.         OBJECTIVE  Visit Vitals  /76   Pulse 101   Wt 66.2 kg (146 lb)   LMP 2023 (Exact Date)   BMI 28.51 kg/m²   OB Status Pregnant   Smoking Status Former   BSA 1.67 m²      FHT: 155      ASSESSMENT & PLAN    Dot Casey is a 23 y.o.  at 21w3d here for the following concerns we addressed today:    Pregnancy Problems (from 23 to present)       Problem Noted Resolved    Uterine scar from previous surgery affecting pregnancy 2023 by Rowena Cade MD No    Priority:  High      Overview Signed 2023 10:31 AM by Rowena Cade MD     RIGHT CORNUAL WEDGE RESECTION 2023 FOR ECTOPIC  - SCHEDULE FOR  BY 37 WGA         History of surgical procedure 2023 by Tali Colon MD No    Priority:  Medium      Overview Signed 2023  9:36 AM by Tali Colon MD     - 2018 RSO for 14cm ovarian cyst involving entire ovary  -  ovarian cyst, s/p lap cystectomy 2 months PP, path c/w mature teratoma  - 2022 ruptured ectopic in remnant of right tube   - 2023 wedge resection of R cornual ectopic          Loss of infant 2023 by Tali Colon MD No    Priority:  Medium      Overview Addendum 2023  9:37 AM by Tali Colon MD     Infant killed by acquaintance of prior FOB          Elevated blood pressure affecting pregnancy, antepartum 2023 by Tali Colon MD No    Priority:  Medium      Overview Signed 2023  9:44 AM by Tali Colon MD     X1 MR /90 at 13w  P:C pending   CMP with second trimester labs          21 weeks gestation of pregnancy 10/6/2023 by Quintin Chopra No    Priority:  Medium      Overview Addendum 2023  9:47 AM by Tali Colon MD     [X] Prenatal labs: 1st tri  [x] datinwk US  [x] US 10/9 13w3d NT wnl  [x] Anatomic survey at  19-20w - [facility]  [ ] Third trimester labs (Syphilis, 1hr, CBC +/- HIV)  [ ] Immunizations: [ ] Flu [ ] TDAP [ ] RSV [ ] Covid (declined flu/covid booster )  [ ] Contraception plan- undecided   [x] bottle feeding   [ ] 35w GBS  [ ] del plan - CS @36-37wga; h/o ectopic x2 - s/p cornual ectopic wedge resection 2023         Status post ectopic pregnancy 10/6/2023 by Quintin Chopra No    Priority:  Medium      Overview Signed 2023  9:37 AM by Tali Colon MD     X2, one cornual with wedge resection and one in tubal remnant on right                   Problem List Items Addressed This Visit       Uterine scar from previous surgery affecting pregnancy    Overview     RIGHT CORNUAL WEDGE RESECTION 2023 FOR ECTOPIC  - SCHEDULE FOR  BY 37 WGA         17 weeks gestation of pregnancy    Overview     [X] Prenatal labs: 1st tri  [x] datinwk US  [x] US 10/9 13w3d NT wnl  [x] Anatomic survey at 19-20w - [facility]  [ ] Third trimester labs (Syphilis, 1hr, CBC +/- HIV)  [ ] Immunizations: [ ] Flu [ ] TDAP [ ] RSV [ ] Covid  [ ] Contraception plan  [ ] 35w GBS  [ ] del plan - CS @36-37wga; h/o ectopic x2 - s/p cornual ectopic wedge resection 2023              No orders of the defined types were placed in this encounter.       RTC in 4 weeks  Pt seen and dw Dr Rayo Colon MD

## 2024-01-02 ENCOUNTER — APPOINTMENT (OUTPATIENT)
Dept: OBSTETRICS AND GYNECOLOGY | Facility: CLINIC | Age: 24
End: 2024-01-02
Payer: MEDICAID

## 2024-01-19 ENCOUNTER — ROUTINE PRENATAL (OUTPATIENT)
Dept: OBSTETRICS AND GYNECOLOGY | Facility: CLINIC | Age: 24
End: 2024-01-19
Payer: MEDICAID

## 2024-01-19 VITALS — DIASTOLIC BLOOD PRESSURE: 76 MMHG | BODY MASS INDEX: 30.82 KG/M2 | SYSTOLIC BLOOD PRESSURE: 111 MMHG | WEIGHT: 157.8 LBS

## 2024-01-19 DIAGNOSIS — K21.9 GASTROESOPHAGEAL REFLUX DISEASE WITHOUT ESOPHAGITIS: ICD-10-CM

## 2024-01-19 DIAGNOSIS — Z3A.28 28 WEEKS GESTATION OF PREGNANCY (HHS-HCC): ICD-10-CM

## 2024-01-19 DIAGNOSIS — Z63.4 LOSS OF INFANT: ICD-10-CM

## 2024-01-19 DIAGNOSIS — O16.9 ELEVATED BLOOD PRESSURE AFFECTING PREGNANCY, ANTEPARTUM (HHS-HCC): ICD-10-CM

## 2024-01-19 DIAGNOSIS — Z34.90 PREGNANCY, UNSPECIFIED GESTATIONAL AGE (HHS-HCC): ICD-10-CM

## 2024-01-19 DIAGNOSIS — Z87.59 STATUS POST ECTOPIC PREGNANCY: ICD-10-CM

## 2024-01-19 DIAGNOSIS — Z98.890 HISTORY OF SURGICAL PROCEDURE: ICD-10-CM

## 2024-01-19 DIAGNOSIS — O34.29 UTERINE SCAR FROM PREVIOUS SURGERY AFFECTING PREGNANCY (HHS-HCC): Primary | ICD-10-CM

## 2024-01-19 PROCEDURE — 99213 OFFICE O/P EST LOW 20 MIN: CPT | Mod: GC,TH | Performed by: STUDENT IN AN ORGANIZED HEALTH CARE EDUCATION/TRAINING PROGRAM

## 2024-01-19 PROCEDURE — 90715 TDAP VACCINE 7 YRS/> IM: CPT | Mod: GC | Performed by: STUDENT IN AN ORGANIZED HEALTH CARE EDUCATION/TRAINING PROGRAM

## 2024-01-19 PROCEDURE — 99213 OFFICE O/P EST LOW 20 MIN: CPT | Performed by: STUDENT IN AN ORGANIZED HEALTH CARE EDUCATION/TRAINING PROGRAM

## 2024-01-19 RX ORDER — FAMOTIDINE 20 MG/1
20 TABLET, FILM COATED ORAL 2 TIMES DAILY
Qty: 90 TABLET | Refills: 3 | Status: SHIPPED | OUTPATIENT
Start: 2024-01-19 | End: 2024-02-29 | Stop reason: WASHOUT

## 2024-01-19 SDOH — SOCIAL STABILITY - SOCIAL INSECURITY: DISSAPEARANCE AND DEATH OF FAMILY MEMBER: Z63.4

## 2024-01-19 NOTE — ASSESSMENT & PLAN NOTE
- tdap given today  - flu and covid at next visit  - [email or web address] given  - breast pump and belt requested, discussed benefits of breast feeding today, patient considering  - 1 hour and third trimester labs given, will get done on monday

## 2024-01-19 NOTE — PROGRESS NOTES
I saw and evaluated the patient. I personally obtained the key and critical portions of the history and physical exam or was physically present for key and critical portions performed by the resident/fellow. I reviewed the resident/fellow's documentation and discussed the patient with the resident/fellow. I agree with the resident/fellow's medical decision making as documented in the note.    Barbara Thomas MD

## 2024-01-19 NOTE — PROGRESS NOTES
Ob Follow-up  24     SUBJECTIVE      HPI: Dot Casey is a 23 y.o.  at 28w0d here for RPNV.  She has no contractions, bleeding, or LOF. Reports normal fetal movement.       OBJECTIVE  Visit Vitals  /76   Wt 71.6 kg (157 lb 12.8 oz)   LMP 2023 (Exact Date)   BMI 30.82 kg/m²   OB Status Pregnant   Smoking Status Former   BSA 1.74 m²            ASSESSMENT & PLAN    Dot Casey is a 23 y.o.  at 28w0d here for the following concerns we addressed today:    Problem List Items Addressed This Visit          Pregnancy    Uterine scar from previous surgery affecting pregnancy - Primary    Overview     RIGHT CORNUAL WEDGE RESECTION 2023 FOR ECTOPIC  - SCHEDULE FOR  BY 37 WGA         Status post ectopic pregnancy    Overview     X2, one cornual with wedge resection and one in tubal remnant on right          Loss of infant    Overview     Infant killed by acquaintance of prior FOB          History of surgical procedure    Overview     -  RSO for 14cm ovarian cyst involving entire ovary  -  ovarian cyst, s/p lap cystectomy 2 months PP, path c/w mature teratoma  - 2022 ruptured ectopic in remnant of right tube   - 2023 wedge resection of R cornual ectopic          Elevated blood pressure affecting pregnancy, antepartum    Overview     X1 MR /90 at 13w  P:C 0.09 124  CMP with second trimester labs          28 weeks gestation of pregnancy    Overview     [X] Prenatal labs: 1st tri  [x] datinwk US  [x] US 10/ 13w3d NT wnl  [x] Anatomic survey at 19-20w - [facility]  [/] Third trimester labs (Syphilis, 1hr, CBC +/- HIV)  [ ] Immunizations: [ ] Flu [ ] TDAP [ ] RSV [ ] Covid (declined flu/covid booster )  [ ] Contraception plan- undecided - [email or web address] resource given   [x] beast feeding - yes  [ ] 35w GBS  [ ] del plan - CS @36-37wga; h/o ectopic x2 - s/p cornual ectopic wedge resection 2023  FOB wants to do skin to skin in OR and cut cord         Current  Assessment & Plan     - tdap given today  - flu and covid at next visit  - [email or web address] given  - breast pump and belt requested, discussed benefits of breast feeding today, patient considering  - 1 hour and third trimester labs given, will get done on monday         Relevant Orders    Glucose, 1 Hour Screen, Pregnancy    CBC Anemia Panel With Reflex, Pregnancy    Syphilis Screen with Reflex    Follow Up In Obstetrics     Other Visit Diagnoses       Pregnancy, unspecified gestational age        Relevant Medications    prenatal vitamin, iron-folic, (prenatal vit no.130-iron-folic) 27 mg iron-800 mcg folic acid tablet    Gastroesophageal reflux disease without esophagitis        Relevant Medications    famotidine (Pepcid) 20 mg tablet              Orders Placed This Encounter   Procedures    Glucose, 1 Hour Screen, Pregnancy     Standing Status:   Future     Standing Expiration Date:   1/19/2025     Order Specific Question:   Release result to MyChart     Answer:   Immediate [1]    CBC Anemia Panel With Reflex, Pregnancy     Standing Status:   Future     Standing Expiration Date:   1/19/2025     Order Specific Question:   Release result to MyChart     Answer:   Immediate [1]    Syphilis Screen with Reflex     Standing Status:   Future     Standing Expiration Date:   1/19/2025     Order Specific Question:   Release result to MyChart     Answer:   Immediate [1]        RTC in 2 weeks    Braydon Sanders MD   Seen and discussed with Dr. Thomas

## 2024-02-08 ENCOUNTER — APPOINTMENT (OUTPATIENT)
Dept: OBSTETRICS AND GYNECOLOGY | Facility: CLINIC | Age: 24
End: 2024-02-08
Payer: MEDICAID

## 2024-02-10 PROBLEM — N83.209 SIMPLE OVARIAN CYST: Status: RESOLVED | Noted: 2023-10-06 | Resolved: 2024-02-10

## 2024-02-10 PROBLEM — K59.00 CONSTIPATION: Status: RESOLVED | Noted: 2023-10-06 | Resolved: 2024-02-10

## 2024-02-10 PROBLEM — J00 COMMON COLD: Status: RESOLVED | Noted: 2023-10-06 | Resolved: 2024-02-10

## 2024-02-10 PROBLEM — Z34.80 SUPERVISION OF OTHER NORMAL PREGNANCY, ANTEPARTUM (HHS-HCC): Status: ACTIVE | Noted: 2023-10-06

## 2024-02-10 NOTE — PROGRESS NOTES
Subjective     Dot Casey is a 23 y.o.  at 31w3d with a working estimated date of delivery of 2024, by Ultrasound who presents for a routine prenatal visit.     She denies vaginal bleeding, leakage of fluid, decreased fetal movements, or contractions.    In middle of GCT.     Patient declines flu shot today but would like to get it next visit.     Lots of questions and anxiety around LTCS. Her family is telling her she might die during the LTCS.     Objective   Physical Exam:   Weight: 73.5 kg (162 lb)  Expected Total Weight Gain: 11.5 kg (25 lb)-16 kg (35 lb)   Pregravid BMI: 22.85  BP: 123/76 (HR 93)  Fetal Heart Rate: 150 Fundal Height (cm): 32 cm Presentation: Vertex           Problem List Items Addressed This Visit       Supervision of other normal pregnancy, antepartum    Overview     Desired provider for birth: [x] Physician  [x] Dated by: 7w US  [x] Initial BMI: 22  [x] Prenatal Labs: WNL  [x] Rh status: O+  [x] Genetic Screening:  not ordered  [x] Baby ASA: not prescribed    [x] Anatomy US: WNL   [x] Fetal Sex: female  [] 1hr GCT at 24-28wks:  @31w  [] 3 hr GTT (if indicated):    [x] Tdap (27-36wks): 24  [x] Flu Shot: declined , and     [x] Breastfeeding: yes  [x] Postpartum Birth control method: OCPs at 6 weeks   [] Labor Support:   [] GBS at 36 wks:  [x] del plan - CS @36-37wga; h/o ectopic x2 - s/p cornual ectopic wedge resection 2023  **FOB wants to do skin to skin in OR and cut cord**         Status post ectopic pregnancy    Overview     X2, one cornual with wedge resection and one in tubal remnant on right          Uterine scar from previous surgery affecting pregnancy    Overview     RIGHT CORNUAL WEDGE RESECTION 2023 FOR ECTOPIC  - SCHEDULE FOR  BY 37 WGA         Elevated blood pressure affecting pregnancy, antepartum    Overview     X1 MR /90 at 13w  P:C 0.09 12/4  CMP with second trimester labs           Other Visit Diagnoses       31  weeks gestation of pregnancy    -  Primary            Postpartum contraception options discussed, OCPS  Provided education on reason for recommendation to have LTCS, general process of  and pain management with epidural.  GCT, CBC, and syphilis testing today  Reviewed s/sx of PTL, warning signs, fetal movement counts, and when to call provider  Follow up in 2 weeks for a routine prenatal visit with MD only   *next visit: flu shot, book LTCS    GUILLERMO Barron

## 2024-02-12 ENCOUNTER — ROUTINE PRENATAL (OUTPATIENT)
Dept: OBSTETRICS AND GYNECOLOGY | Facility: CLINIC | Age: 24
End: 2024-02-12
Payer: MEDICAID

## 2024-02-12 ENCOUNTER — LAB (OUTPATIENT)
Dept: LAB | Facility: LAB | Age: 24
End: 2024-02-12
Payer: MEDICAID

## 2024-02-12 VITALS — WEIGHT: 162 LBS | SYSTOLIC BLOOD PRESSURE: 123 MMHG | DIASTOLIC BLOOD PRESSURE: 76 MMHG | BODY MASS INDEX: 31.64 KG/M2

## 2024-02-12 DIAGNOSIS — O16.9 ELEVATED BLOOD PRESSURE AFFECTING PREGNANCY, ANTEPARTUM (HHS-HCC): ICD-10-CM

## 2024-02-12 DIAGNOSIS — Z87.59 STATUS POST ECTOPIC PREGNANCY: ICD-10-CM

## 2024-02-12 DIAGNOSIS — Z34.80 SUPERVISION OF OTHER NORMAL PREGNANCY, ANTEPARTUM (HHS-HCC): ICD-10-CM

## 2024-02-12 DIAGNOSIS — O34.29 UTERINE SCAR FROM PREVIOUS SURGERY AFFECTING PREGNANCY (HHS-HCC): ICD-10-CM

## 2024-02-12 DIAGNOSIS — Z3A.31 31 WEEKS GESTATION OF PREGNANCY (HHS-HCC): Primary | ICD-10-CM

## 2024-02-12 DIAGNOSIS — Z3A.28 28 WEEKS GESTATION OF PREGNANCY (HHS-HCC): ICD-10-CM

## 2024-02-12 LAB
ERYTHROCYTE [DISTWIDTH] IN BLOOD BY AUTOMATED COUNT: 13.1 % (ref 11.5–14.5)
GLUCOSE 1H P 50 G GLC PO SERPL-MCNC: 118 MG/DL
HCT VFR BLD AUTO: 36.4 % (ref 36–46)
HGB BLD-MCNC: 12.4 G/DL (ref 12–16)
MCH RBC QN AUTO: 32.3 PG (ref 26–34)
MCHC RBC AUTO-ENTMCNC: 34.1 G/DL (ref 32–36)
MCV RBC AUTO: 95 FL (ref 80–100)
NRBC BLD-RTO: 0.1 /100 WBCS (ref 0–0)
PLATELET # BLD AUTO: 248 X10*3/UL (ref 150–450)
RBC # BLD AUTO: 3.84 X10*6/UL (ref 4–5.2)
REFLEX ADDED, ANEMIA PANEL: NORMAL
TREPONEMA PALLIDUM IGG+IGM AB [PRESENCE] IN SERUM OR PLASMA BY IMMUNOASSAY: NONREACTIVE
WBC # BLD AUTO: 14 X10*3/UL (ref 4.4–11.3)

## 2024-02-12 PROCEDURE — 86780 TREPONEMA PALLIDUM: CPT

## 2024-02-12 PROCEDURE — 82947 ASSAY GLUCOSE BLOOD QUANT: CPT

## 2024-02-12 PROCEDURE — 99213 OFFICE O/P EST LOW 20 MIN: CPT | Performed by: ADVANCED PRACTICE MIDWIFE

## 2024-02-12 PROCEDURE — 36415 COLL VENOUS BLD VENIPUNCTURE: CPT

## 2024-02-12 PROCEDURE — 85027 COMPLETE CBC AUTOMATED: CPT

## 2024-02-12 PROCEDURE — 99213 OFFICE O/P EST LOW 20 MIN: CPT | Mod: TH,25 | Performed by: ADVANCED PRACTICE MIDWIFE

## 2024-02-29 ENCOUNTER — TELEPHONE (OUTPATIENT)
Dept: OBSTETRICS AND GYNECOLOGY | Facility: CLINIC | Age: 24
End: 2024-02-29

## 2024-02-29 ENCOUNTER — PREP FOR PROCEDURE (OUTPATIENT)
Dept: OBSTETRICS AND GYNECOLOGY | Facility: HOSPITAL | Age: 24
End: 2024-02-29

## 2024-02-29 ENCOUNTER — ROUTINE PRENATAL (OUTPATIENT)
Dept: OBSTETRICS AND GYNECOLOGY | Facility: CLINIC | Age: 24
End: 2024-02-29
Payer: MEDICAID

## 2024-02-29 VITALS
BODY MASS INDEX: 33.01 KG/M2 | DIASTOLIC BLOOD PRESSURE: 78 MMHG | HEART RATE: 78 BPM | SYSTOLIC BLOOD PRESSURE: 122 MMHG | WEIGHT: 169 LBS

## 2024-02-29 DIAGNOSIS — O34.29 UTERINE SCAR FROM PREVIOUS SURGERY AFFECTING PREGNANCY (HHS-HCC): ICD-10-CM

## 2024-02-29 DIAGNOSIS — O16.9 ELEVATED BLOOD PRESSURE AFFECTING PREGNANCY, ANTEPARTUM (HHS-HCC): ICD-10-CM

## 2024-02-29 DIAGNOSIS — Z34.80 SUPERVISION OF OTHER NORMAL PREGNANCY, ANTEPARTUM (HHS-HCC): Primary | ICD-10-CM

## 2024-02-29 PROCEDURE — 87081 CULTURE SCREEN ONLY: CPT

## 2024-02-29 PROCEDURE — 99213 OFFICE O/P EST LOW 20 MIN: CPT

## 2024-02-29 PROCEDURE — 99213 OFFICE O/P EST LOW 20 MIN: CPT | Mod: GC,TH

## 2024-02-29 ASSESSMENT — PAIN - FUNCTIONAL ASSESSMENT: PAIN_FUNCTIONAL_ASSESSMENT: 0-10

## 2024-02-29 ASSESSMENT — PAIN SCALES - GENERAL: PAINLEVEL_OUTOF10: 0 - NO PAIN

## 2024-02-29 NOTE — PROGRESS NOTES
I saw and evaluated the patient. I personally obtained the key and critical portions of the history and physical exam or was physically present for key and critical portions performed by the resident/fellow. I reviewed the resident/fellow's documentation and discussed the patient with the resident/fellow. I agree with the resident/fellow's medical decision making as documented in the note.    Alicia Laguna MD

## 2024-02-29 NOTE — ASSESSMENT & PLAN NOTE
Up to date. GBS collected today in setting of early delivery planning. RN tasked to schedule  36-37 wga in s/o hx of cornual wedge resection

## 2024-02-29 NOTE — TELEPHONE ENCOUNTER
Pt notified that she is scheduled for c/ on 3/15 at 8:30 am with 6:30 arrival. Discussed NPO after midnight the day before and to do preop labs 2 days before. Discussed enhanced recovery after surgery and mailed pt eras packet. Dr Garcia to enter in order for delivery and labs        ----- Message from Carmina Garcia MD sent at 2024 10:13 AM EST -----  Hi, this patient is 33.6wga. can we schedule her primary  at 36-37wga in the setting of hx of cornual wedge resection?    Thank you!  Carmina

## 2024-02-29 NOTE — PROGRESS NOTES
Ob Follow-up  24     SUBJECTIVE      HPI: Dot Casey is a 23 y.o.  at 33w6d here for RPNV.  She has no contractions, no bleeding, or no LOF. Reports normal fetal movement. Patient does not report headaches, visual changes, chest pain, shortness of breath or RUQ pain         OBJECTIVE  Visit Vitals  /78   Pulse 78   Wt 76.7 kg (169 lb)   LMP 2023 (Exact Date)   BMI 33.01 kg/m²   OB Status Pregnant   Smoking Status Former   BSA 1.8 m²      FHT: 145  FH: 34      ASSESSMENT & PLAN    Dot Casey is a 23 y.o.  at 33w6d here for the following concerns we addressed today:    Problem List Items Addressed This Visit          Pregnancy    Uterine scar from previous surgery affecting pregnancy    Overview     RIGHT CORNUAL WEDGE RESECTION 2023 FOR ECTOPIC  - SCHEDULE FOR  BY 37 WGA         Current Assessment & Plan     RN tasked to schedule 36-37wga         Supervision of other normal pregnancy, antepartum - Primary    Overview     Desired provider for birth: [x] Physician  [x] Dated by: 7w US  [x] Initial BMI: 22  [x] Prenatal Labs: WNL  [x] Rh status: O+  [x] Genetic Screening:  not ordered  [x] Baby ASA: not prescribed    [x] Anatomy US: WNL   [x] Fetal Sex: female  [x] 1hr GCT at 24-28wks:  wnl  [x] 3 hr GTT (if indicated): n/a    [x] Tdap (27-36wks): 24  [x] Flu Shot: declined , and     [x] Breastfeeding: yes  [x] Postpartum Birth control method: OCPs at 6 weeks   [] Labor Support:   [] GBS at 36 wks: Collected  (33.6wga) in s/o of early delivery planning for hx of cornual wedge resection  [x] del plan - CS @36-37wga; h/o ectopic x2 - s/p cornual ectopic wedge resection 2023  **FOB wants to do skin to skin in OR and cut cord**         Current Assessment & Plan     Up to date. GBS collected today in setting of early delivery planning. RN tasked to schedule  36-37 wga in s/o hx of cornual wedge resection         Relevant Orders     Group B Streptococcus (GBS) Prenatal Screen, Culture    Elevated blood pressure affecting pregnancy, antepartum    Overview     X1 MR /90 at 13w  P:C 0.09 12/4  CMP with second trimester labs          Current Assessment & Plan     Normotensive today. No PEC sx              Orders Placed This Encounter   Procedures    Group B Streptococcus (GBS) Prenatal Screen, Culture     If PCN-allergic, please send sensitivities     Order Specific Question:   Release result to [facility]     Answer:   Immediate [1]       RTC in 1 week  Seen and discussed with Dr. Jase Garcia MD

## 2024-03-03 LAB — GP B STREP GENITAL QL CULT: NORMAL

## 2024-03-06 NOTE — PROGRESS NOTES
OB Follow-up  3/7/24        SUBJECTIVE  HPI: Dot Casey is a 23 y.o.  at 34w6d here for RPNV. Denies contractions, bleeding, or LOF. Reports normal fetal movement. Patient reports no complaints.       OBJECTIVE  Visit Vitals  /76   Pulse 96   Wt 75.8 kg (167 lb)   LMP 2023 (Exact Date)   BMI 32.61 kg/m²   OB Status Pregnant   Smoking Status Former   BSA 1.79 m²      FHT: 150    ASSESSMENT & PLAN    Dot Casey is a 23 y.o.  at 34w6d here for the following concerns we addressed today:    34 weeks gestation of pregnancy  GBS negative  CS scheduled for 3/15  All questions answered.    Uterine scar from previous surgery affecting pregnancy  Preoperative labs ordered for 3/13    Elevated blood pressure affecting pregnancy, antepartum  BP today 117/76  Asx.     No orders of the defined types were placed in this encounter.     RTC in 1 weeks  Patient seen and evaluated with Dr. Laguna.    Tali Hastings MD

## 2024-03-06 NOTE — ASSESSMENT & PLAN NOTE
Preoperative labs ordered for 3/13   Subjective


Date of Service: 07/21/18


Interval History: 





Pt had a syncopal episode when OOB post op last night. then developed a. fib, 

converted to sinus tachy after lopressor. denied CP/SOB, remembers little from 

the episode.


today post op pain well controlled





Objective


Active Medications: 








Acetaminophen (Tylenol Tab*)  650 mg PO Q4H PRN


   PRN Reason: FEVER/PAIN


Albuterol (Ventolin 2.5 Mg/3 Ml Neb.Sol*)  2.5 mg INH Q2H PRN


   PRN Reason: SOB/WHEEZING


Atorvastatin Calcium (Lipitor*)  20 mg PO BEDTIME [facility]; Protocol


   Last Admin: 07/20/18 21:51 Dose:  20 mg


Calcium Carbonate (Tums*)  1,000 mg PO Q4H PRN


   PRN Reason: HEARTBURN


Cholecalciferol (Vitamin D Tab*)  4,000 units PO DAILY [facility]


   Last Admin: 07/21/18 08:42 Dose:  4,000 units


Diphenhydramine HCl (Benadryl Po*)  25 mg PO Q6H PRN


   PRN Reason: ITCHING


Docusate Sodium (Colace Cap*)  100 mg PO BID [facility]


   Last Admin: 07/21/18 08:43 Dose:  100 mg


Enoxaparin Sodium (Lovenox(*))  40 mg SUBCUT DAILY [facility]


   Last Admin: 07/21/18 09:12 Dose:  40 mg


Lactated Ringer's (Lactated Ringers 1000 Ml Bag*)  1,000 mls @ 125 mls/hr IV 

PER RATE [facility]


   Last Admin: 07/21/18 00:45 Dose:  125 mls/hr


Clindamycin HCl/Dextrose (Cleocin 600 Mg Ivpremix(*) Sdv)  600 mg in 50 mls @ 

100 mls/hr IV Q8H [facility]


   Stop: 07/21/18 16:59


   Last Admin: 07/21/18 08:47 Dose:  100 mls/hr


Diltiazem HCl 125 mg/ Sodium (Chloride)  125 mls @ 5 mls/hr IV Q24H [facility]; 

Protocol


   Last Admin: 07/20/18 23:25 Dose:  5 mls/hr


Magnesium Hydroxide (Milk Of Magnesia Liq*)  30 ml PO BID [facility]


   Last Admin: 07/21/18 08:42 Dose:  30 ml


Magnesium Hydroxide (Milk Of Magnesia Liq*)  30 ml PO BID PRN


   PRN Reason: CONSTIPATION


Metoprolol Tartrate (Lopressor Tab*)  12.5 mg PO Q12HR [facility]


   Last Admin: 07/21/18 08:43 Dose:  12.5 mg


Morphine Sulfate (Morphine Vial*)  2 mg IV Q4H PRN


   PRN Reason: PAIN - BREAKTHROUGH (SEVERE)


   Last Admin: 07/20/18 12:34 Dose:  2 mg


Omeprazole (Prilosec Cap*)  20 mg PO DAILY [facility]


   Last Admin: 07/21/18 08:43 Dose:  20 mg


Ondansetron HCl (Zofran Inj*)  4 mg IV Q6H PRN


   PRN Reason: NAUSEA


Oxycodone/Acetaminophen (Percocet 5/325 Tab*)  1 tab PO Q4H PRN


   PRN Reason: PAIN - MODERATE


   Last Admin: 07/21/18 08:44 Dose:  1 tab


Oxycodone/Acetaminophen (Percocet 5/325 Tab*)  2 tab PO Q4H PRN


   PRN Reason: PAIN - SEVERE


   Last Admin: 07/20/18 05:29 Dose:  2 tab


Senna (Senokot Tab*)  1 tab PO BEDTIME PRN


   PRN Reason: CONSTIPATION


Tramadol HCl (Ultram*)  50 mg PO Q8H PRN


   PRN Reason: PAIN


   Last Admin: 07/21/18 02:08 Dose:  50 mg








 Vital Signs - 8 hr











  07/21/18 07/21/18 07/21/18





  02:30 02:45 03:00


 


Temperature   


 


Pulse Rate 143 128 127


 


Respiratory 20 16 21





Rate   


 


Blood Pressure 111/62 93/77 93/61





(mmHg)   


 


O2 Sat by Pulse 95 95 96





Oximetry   














  07/21/18 07/21/18 07/21/18





  03:10 03:15 03:30


 


Temperature 98.8 F  


 


Pulse Rate  94 78


 


Respiratory  15 37





Rate   


 


Blood Pressure  103/55 104/66





(mmHg)   


 


O2 Sat by Pulse  95 92





Oximetry   














  07/21/18 07/21/18 07/21/18





  03:45 04:00 04:15


 


Temperature   


 


Pulse Rate 81 85 92


 


Respiratory 33 26 26





Rate   


 


Blood Pressure 103/52 86/55 101/55





(mmHg)   


 


O2 Sat by Pulse 93 94 94





Oximetry   














  07/21/18 07/21/18 07/21/18





  04:30 04:45 05:00


 


Temperature   


 


Pulse Rate 88 88 102


 


Respiratory 25 25 28





Rate   


 


Blood Pressure 111/56 112/53 117/66





(mmHg)   


 


O2 Sat by Pulse 94 93 95





Oximetry   














  07/21/18 07/21/18 07/21/18





  05:15 05:30 05:45


 


Temperature   


 


Pulse Rate 94 101 102


 


Respiratory 28 24 28





Rate   


 


Blood Pressure 104/69 116/61 121/60





(mmHg)   


 


O2 Sat by Pulse 94 95 95





Oximetry   














  07/21/18 07/21/18 07/21/18





  06:00 07:00 08:00


 


Temperature   98.5 F


 


Pulse Rate 101 100 99


 


Respiratory 27 30 32





Rate   


 


Blood Pressure 108/60 132/55 119/56





(mmHg)   


 


O2 Sat by Pulse 96 94 94





Oximetry   














  07/21/18 07/21/18





  08:44 09:00


 


Temperature  


 


Pulse Rate  106


 


Respiratory 21 20





Rate  


 


Blood Pressure  125/67





(mmHg)  


 


O2 Sat by Pulse  96





Oximetry  











Oxygen Devices in Use Now: Nasal Cannula


Appearance: 71 yo f in nAD, aAOx3


Eyes: No Scleral Icterus, PERRLA


Ears/Nose/Mouth/Throat: NL Teeth, Lips, Gums, Mucous Membranes Moist


Neck: NL Appearance and Movements; NL JVP, Trachea Midline, No Thyroid 

Enlargement, Masses


Respiratory: Symmetrical Chest Expansion and Respiratory Effort


Cardiovascular: NL Sounds; No Murmurs; No JVD, RRR, - - tachy


Abdominal: NL Sounds; No Tenderness; No Distention, No Hepatosplenomegaly


Lymphatic: No Cervical Adenopathy


Extremities: No Edema, No Clubbing, Cyanosis, - - left hip post op dressings 

not removed


Skin: No Nodules or Sclerosis, - - left shoulder ecchymosis unchanged


Neurological: Alert and Oriented x 3, NL Muscle Strength and Tone


Result Diagrams: 


 07/21/18 05:00





 07/21/18 05:00


Additional Lab and Data: 


 Lab Results











  07/19/18 07/19/18 07/19/18 Range/Units





  09:48 09:54 09:54 


 


WBC   6.5   (3.5-10.8)  10^3/ul


 


RBC   4.95   (4.00-5.40)  10^6/ul


 


Hgb   14.1   (12.0-16.0)  g/dl


 


Hct   42   (35-47)  %


 


MCV   85   (80-97)  fL


 


MCH   29   (27-31)  pg


 


MCHC   34   (31-36)  g/dl


 


RDW   14   (10.5-15)  %


 


Plt Count   193   (150-450)  10^3/ul


 


MPV   7.8   (7.4-10.4)  um3


 


Neut % (Auto)   69.9   (38-83)  %


 


Lymph % (Auto)   13.8 L   (25-47)  %


 


Mono % (Auto)   14.2 H   (0-7)  %


 


Eos % (Auto)   1.4   (0-6)  %


 


Baso % (Auto)   0.7   (0-2)  %


 


Absolute Neuts (auto)   4.5   (1.5-7.7)  10^3/ul


 


Absolute Lymphs (auto)   0.9 L   (1.0-4.8)  10^3/ul


 


Absolute Monos (auto)   0.9 H   (0-0.8)  10^3/ul


 


Absolute Eos (auto)   0.1   (0-0.6)  10^3/ul


 


Absolute Basos (auto)   0   (0-0.2)  10^3/ul


 


Absolute Nucleated RBC   0   10^3/ul


 


Nucleated RBC %   0.3   


 


INR (Anticoag Therapy)    1.02  (0.77-1.02)  


 


APTT    45.1 H  (26.0-36.3)  seconds


 


Sodium     (135-145)  mmol/L


 


Potassium     (3.5-5.0)  mmol/L


 


Chloride     (101-111)  mmol/L


 


Carbon Dioxide     (22-32)  mmol/L


 


Anion Gap     (2-11)  mmol/L


 


BUN     (6-24)  mg/dL


 


Creatinine     (0.51-0.95)  mg/dL


 


Est GFR ( Amer)     (>60)  


 


Est GFR (Non-Af Amer)     (>60)  


 


BUN/Creatinine Ratio     (8-20)  


 


Glucose     ()  mg/dL


 


Lactic Acid     (0.5-2.0)  mmol/L


 


Calcium     (8.6-10.3)  mg/dL


 


Total Bilirubin     (0.2-1.0)  mg/dL


 


AST     (13-39)  U/L


 


ALT     (7-52)  U/L


 


Alkaline Phosphatase     ()  U/L


 


Troponin I     (<0.04)  ng/mL


 


Total Protein     (6.4-8.9)  g/dL


 


Albumin     (3.2-5.2)  g/dL


 


Globulin     (2-4)  g/dL


 


Albumin/Globulin Ratio     (1-3)  


 


Urine Color  Straw    


 


Urine Appearance  Clear    


 


Urine pH  7.0    (5-9)  


 


Ur Specific Gravity  1.003 L    (1.010-1.030)  


 


Urine Protein  Negative    (Negative)  


 


Urine Ketones  1+ A    (Negative)  


 


Urine Blood  Negative    (Negative)  


 


Urine Nitrate  Negative    (Negative)  


 


Urine Bilirubin  Negative    (Negative)  


 


Urine Urobilinogen  Negative    (Negative)  


 


Ur Leukocyte Esterase  Negative    (Negative)  


 


Urine Glucose  Negative    (Negative)  














  07/19/18 07/19/18 Range/Units





  09:54 09:54 


 


WBC    (3.5-10.8)  10^3/ul


 


RBC    (4.00-5.40)  10^6/ul


 


Hgb    (12.0-16.0)  g/dl


 


Hct    (35-47)  %


 


MCV    (80-97)  fL


 


MCH    (27-31)  pg


 


MCHC    (31-36)  g/dl


 


RDW    (10.5-15)  %


 


Plt Count    (150-450)  10^3/ul


 


MPV    (7.4-10.4)  um3


 


Neut % (Auto)    (38-83)  %


 


Lymph % (Auto)    (25-47)  %


 


Mono % (Auto)    (0-7)  %


 


Eos % (Auto)    (0-6)  %


 


Baso % (Auto)    (0-2)  %


 


Absolute Neuts (auto)    (1.5-7.7)  10^3/ul


 


Absolute Lymphs (auto)    (1.0-4.8)  10^3/ul


 


Absolute Monos (auto)    (0-0.8)  10^3/ul


 


Absolute Eos (auto)    (0-0.6)  10^3/ul


 


Absolute Basos (auto)    (0-0.2)  10^3/ul


 


Absolute Nucleated RBC    10^3/ul


 


Nucleated RBC %    


 


INR (Anticoag Therapy)    (0.77-1.02)  


 


APTT    (26.0-36.3)  seconds


 


Sodium  141   (135-145)  mmol/L


 


Potassium  3.6   (3.5-5.0)  mmol/L


 


Chloride  102   (101-111)  mmol/L


 


Carbon Dioxide  29   (22-32)  mmol/L


 


Anion Gap  10   (2-11)  mmol/L


 


BUN  6   (6-24)  mg/dL


 


Creatinine  0.63   (0.51-0.95)  mg/dL


 


Est GFR ( Amer)  111.8   (>60)  


 


Est GFR (Non-Af Amer)  92.4   (>60)  


 


BUN/Creatinine Ratio  9.5   (8-20)  


 


Glucose  107 H   ()  mg/dL


 


Lactic Acid   1.0  (0.5-2.0)  mmol/L


 


Calcium  9.2   (8.6-10.3)  mg/dL


 


Total Bilirubin  1.10 H   (0.2-1.0)  mg/dL


 


AST  15   (13-39)  U/L


 


ALT  11   (7-52)  U/L


 


Alkaline Phosphatase  49   ()  U/L


 


Troponin I  0.00   (<0.04)  ng/mL


 


Total Protein  6.5   (6.4-8.9)  g/dL


 


Albumin  3.9   (3.2-5.2)  g/dL


 


Globulin  2.6   (2-4)  g/dL


 


Albumin/Globulin Ratio  1.5   (1-3)  


 


Urine Color    


 


Urine Appearance    


 


Urine pH    (5-9)  


 


Ur Specific Gravity    (1.010-1.030)  


 


Urine Protein    (Negative)  


 


Urine Ketones    (Negative)  


 


Urine Blood    (Negative)  


 


Urine Nitrate    (Negative)  


 


Urine Bilirubin    (Negative)  


 


Urine Urobilinogen    (Negative)  


 


Ur Leukocyte Esterase    (Negative)  


 


Urine Glucose    (Negative)  











Microbiology and Other Data: 


 Microbiology











 07/20/18 23:53 Nasal Screen MRSA (PCR) - Final





 Nasal    Mrsa Not Detected














Assess/Plan/Problems-Billing


Assessment: 71 yo F with h/o tachycardia presents with mechanical left hip fx 

and left clavicle fx











- Patient Problems


(1) Closed left hip fracture


Comment: s/p left hip hemiarthroplasty performed by  on 7/20/18   





(2) Closed left clavicular fracture


Comment: d/w DR. Thapa, OK to use sling for comfort   





(3) Tachycardia


Comment: in sinus tachy


evaluated by cardiology  in the past as per pt.


Developed short perior of A. fib with RVR post op, converted to sinus tacchy 

after BB. In face of hypokalemia and hypomagnesemia


Echo pending. started lopressor   





(4) DVT prophylaxis


Comment: lovenox   


Status and Disposition: 


inpatient

## 2024-03-07 ENCOUNTER — ROUTINE PRENATAL (OUTPATIENT)
Dept: OBSTETRICS AND GYNECOLOGY | Facility: CLINIC | Age: 24
End: 2024-03-07
Payer: MEDICAID

## 2024-03-07 VITALS
WEIGHT: 167 LBS | DIASTOLIC BLOOD PRESSURE: 76 MMHG | SYSTOLIC BLOOD PRESSURE: 117 MMHG | BODY MASS INDEX: 32.61 KG/M2 | HEART RATE: 96 BPM

## 2024-03-07 DIAGNOSIS — Z87.59 STATUS POST ECTOPIC PREGNANCY: ICD-10-CM

## 2024-03-07 DIAGNOSIS — Z3A.34 34 WEEKS GESTATION OF PREGNANCY (HHS-HCC): Primary | ICD-10-CM

## 2024-03-07 DIAGNOSIS — Z98.890 HISTORY OF SURGICAL PROCEDURE: ICD-10-CM

## 2024-03-07 DIAGNOSIS — O34.29 UTERINE SCAR FROM PREVIOUS SURGERY AFFECTING PREGNANCY (HHS-HCC): ICD-10-CM

## 2024-03-07 DIAGNOSIS — Z63.4 LOSS OF INFANT: ICD-10-CM

## 2024-03-07 DIAGNOSIS — O16.9 ELEVATED BLOOD PRESSURE AFFECTING PREGNANCY, ANTEPARTUM (HHS-HCC): ICD-10-CM

## 2024-03-07 PROCEDURE — 99213 OFFICE O/P EST LOW 20 MIN: CPT

## 2024-03-07 PROCEDURE — 99213 OFFICE O/P EST LOW 20 MIN: CPT | Mod: GC,TH

## 2024-03-07 SDOH — SOCIAL STABILITY - SOCIAL INSECURITY: DISSAPEARANCE AND DEATH OF FAMILY MEMBER: Z63.4

## 2024-03-07 ASSESSMENT — PAIN SCALES - GENERAL: PAINLEVEL_OUTOF10: 0 - NO PAIN

## 2024-03-07 ASSESSMENT — PAIN - FUNCTIONAL ASSESSMENT: PAIN_FUNCTIONAL_ASSESSMENT: 0-10

## 2024-03-13 ENCOUNTER — LAB (OUTPATIENT)
Dept: LAB | Facility: LAB | Age: 24
End: 2024-03-13
Payer: MEDICAID

## 2024-03-13 DIAGNOSIS — Z34.80 SUPERVISION OF OTHER NORMAL PREGNANCY, ANTEPARTUM (HHS-HCC): ICD-10-CM

## 2024-03-13 LAB
ABO GROUP (TYPE) IN BLOOD: NORMAL
ANTIBODY SCREEN: NORMAL
ERYTHROCYTE [DISTWIDTH] IN BLOOD BY AUTOMATED COUNT: 13.2 % (ref 11.5–14.5)
HCT VFR BLD AUTO: 35.4 % (ref 36–46)
HGB BLD-MCNC: 11.7 G/DL (ref 12–16)
MCH RBC QN AUTO: 31.5 PG (ref 26–34)
MCHC RBC AUTO-ENTMCNC: 33.1 G/DL (ref 32–36)
MCV RBC AUTO: 95 FL (ref 80–100)
NRBC BLD-RTO: 0.2 /100 WBCS (ref 0–0)
PLATELET # BLD AUTO: 261 X10*3/UL (ref 150–450)
RBC # BLD AUTO: 3.71 X10*6/UL (ref 4–5.2)
RH FACTOR (ANTIGEN D): NORMAL
WBC # BLD AUTO: 11.6 X10*3/UL (ref 4.4–11.3)

## 2024-03-13 PROCEDURE — 85027 COMPLETE CBC AUTOMATED: CPT

## 2024-03-13 PROCEDURE — 86900 BLOOD TYPING SEROLOGIC ABO: CPT

## 2024-03-13 PROCEDURE — 36415 COLL VENOUS BLD VENIPUNCTURE: CPT

## 2024-03-13 PROCEDURE — 86901 BLOOD TYPING SEROLOGIC RH(D): CPT

## 2024-03-13 PROCEDURE — 86920 COMPATIBILITY TEST SPIN: CPT

## 2024-03-13 PROCEDURE — 86850 RBC ANTIBODY SCREEN: CPT

## 2024-03-14 PROBLEM — Z98.890 HISTORY OF SURGICAL PROCEDURE: Status: RESOLVED | Noted: 2023-12-04 | Resolved: 2024-03-14

## 2024-03-15 ENCOUNTER — ANESTHESIA EVENT (OUTPATIENT)
Dept: OBSTETRICS AND GYNECOLOGY | Facility: HOSPITAL | Age: 24
End: 2024-03-15
Payer: MEDICAID

## 2024-03-15 ENCOUNTER — HOSPITAL ENCOUNTER (INPATIENT)
Facility: HOSPITAL | Age: 24
LOS: 3 days | Discharge: HOME | End: 2024-03-18
Attending: OBSTETRICS & GYNECOLOGY | Admitting: OBSTETRICS & GYNECOLOGY
Payer: MEDICAID

## 2024-03-15 ENCOUNTER — ANESTHESIA (OUTPATIENT)
Dept: OBSTETRICS AND GYNECOLOGY | Facility: HOSPITAL | Age: 24
End: 2024-03-15
Payer: MEDICAID

## 2024-03-15 DIAGNOSIS — D62 ABLA (ACUTE BLOOD LOSS ANEMIA): ICD-10-CM

## 2024-03-15 PROBLEM — D64.9 ANEMIA: Status: ACTIVE | Noted: 2024-03-15

## 2024-03-15 PROBLEM — K21.9 GASTROESOPHAGEAL REFLUX DISEASE: Status: ACTIVE | Noted: 2024-03-15

## 2024-03-15 LAB
ABO GROUP (TYPE) IN BLOOD: NORMAL
ANTIBODY SCREEN: NORMAL
RH FACTOR (ANTIGEN D): NORMAL
TREPONEMA PALLIDUM IGG+IGM AB [PRESENCE] IN SERUM OR PLASMA BY IMMUNOASSAY: NONREACTIVE

## 2024-03-15 PROCEDURE — 36415 COLL VENOUS BLD VENIPUNCTURE: CPT

## 2024-03-15 PROCEDURE — 59514 CESAREAN DELIVERY ONLY: CPT | Performed by: OBSTETRICS & GYNECOLOGY

## 2024-03-15 PROCEDURE — 88307 TISSUE EXAM BY PATHOLOGIST: CPT | Performed by: STUDENT IN AN ORGANIZED HEALTH CARE EDUCATION/TRAINING PROGRAM

## 2024-03-15 PROCEDURE — 3700000018 HC OB ANESTHESIA C-SECTION: Performed by: OBSTETRICS & GYNECOLOGY

## 2024-03-15 PROCEDURE — 2500000004 HC RX 250 GENERAL PHARMACY W/ HCPCS (ALT 636 FOR OP/ED): Performed by: NURSE PRACTITIONER

## 2024-03-15 PROCEDURE — 2720000007 HC OR 272 NO HCPCS: Performed by: OBSTETRICS & GYNECOLOGY

## 2024-03-15 PROCEDURE — 7100000016 HC LABOR RECOVERY PER HOUR

## 2024-03-15 PROCEDURE — 59514 CESAREAN DELIVERY ONLY: CPT

## 2024-03-15 PROCEDURE — 01961 ANES CESAREAN DELIVERY ONLY: CPT | Performed by: NURSE ANESTHETIST, CERTIFIED REGISTERED

## 2024-03-15 PROCEDURE — 2500000004 HC RX 250 GENERAL PHARMACY W/ HCPCS (ALT 636 FOR OP/ED): Performed by: NURSE ANESTHETIST, CERTIFIED REGISTERED

## 2024-03-15 PROCEDURE — 1210000001 HC SEMI-PRIVATE ROOM DAILY

## 2024-03-15 PROCEDURE — 88307 TISSUE EXAM BY PATHOLOGIST: CPT | Mod: TC,SUR

## 2024-03-15 PROCEDURE — 01961 ANES CESAREAN DELIVERY ONLY: CPT | Performed by: STUDENT IN AN ORGANIZED HEALTH CARE EDUCATION/TRAINING PROGRAM

## 2024-03-15 PROCEDURE — 2720000007 HC OR 272 NO HCPCS

## 2024-03-15 PROCEDURE — 86780 TREPONEMA PALLIDUM: CPT

## 2024-03-15 PROCEDURE — 3700000014 HC AN EPIDURAL BLOCK CHARGE: Performed by: OBSTETRICS & GYNECOLOGY

## 2024-03-15 PROCEDURE — 7100000016 HC LABOR RECOVERY PER HOUR: Performed by: OBSTETRICS & GYNECOLOGY

## 2024-03-15 PROCEDURE — 86900 BLOOD TYPING SEROLOGIC ABO: CPT

## 2024-03-15 PROCEDURE — 2500000004 HC RX 250 GENERAL PHARMACY W/ HCPCS (ALT 636 FOR OP/ED)

## 2024-03-15 RX ORDER — OXYTOCIN 10 [USP'U]/ML
10 INJECTION, SOLUTION INTRAMUSCULAR; INTRAVENOUS ONCE AS NEEDED
Status: DISCONTINUED | OUTPATIENT
Start: 2024-03-15 | End: 2024-03-15

## 2024-03-15 RX ORDER — OXYTOCIN/0.9 % SODIUM CHLORIDE 30/500 ML
60 PLASTIC BAG, INJECTION (ML) INTRAVENOUS ONCE AS NEEDED
Status: DISCONTINUED | OUTPATIENT
Start: 2024-03-15 | End: 2024-03-15

## 2024-03-15 RX ORDER — LIDOCAINE HYDROCHLORIDE 10 MG/ML
30 INJECTION INFILTRATION; PERINEURAL ONCE AS NEEDED
Status: DISCONTINUED | OUTPATIENT
Start: 2024-03-15 | End: 2024-03-15

## 2024-03-15 RX ORDER — METOCLOPRAMIDE HYDROCHLORIDE 5 MG/ML
10 INJECTION INTRAMUSCULAR; INTRAVENOUS EVERY 6 HOURS PRN
Status: DISCONTINUED | OUTPATIENT
Start: 2024-03-15 | End: 2024-03-15

## 2024-03-15 RX ORDER — ONDANSETRON HYDROCHLORIDE 2 MG/ML
4 INJECTION, SOLUTION INTRAVENOUS EVERY 6 HOURS PRN
Status: DISCONTINUED | OUTPATIENT
Start: 2024-03-15 | End: 2024-03-15

## 2024-03-15 RX ORDER — ENOXAPARIN SODIUM 100 MG/ML
40 INJECTION SUBCUTANEOUS EVERY 24 HOURS
Status: DISCONTINUED | OUTPATIENT
Start: 2024-03-15 | End: 2024-03-18 | Stop reason: HOSPADM

## 2024-03-15 RX ORDER — OXYCODONE HYDROCHLORIDE 5 MG/1
10 TABLET ORAL EVERY 4 HOURS PRN
Status: DISCONTINUED | OUTPATIENT
Start: 2024-03-16 | End: 2024-03-18 | Stop reason: HOSPADM

## 2024-03-15 RX ORDER — POLYETHYLENE GLYCOL 3350 17 G/17G
17 POWDER, FOR SOLUTION ORAL 2 TIMES DAILY PRN
Status: DISCONTINUED | OUTPATIENT
Start: 2024-03-15 | End: 2024-03-18 | Stop reason: HOSPADM

## 2024-03-15 RX ORDER — ACETAMINOPHEN 325 MG/1
975 TABLET ORAL EVERY 6 HOURS
Status: DISCONTINUED | OUTPATIENT
Start: 2024-03-15 | End: 2024-03-18 | Stop reason: HOSPADM

## 2024-03-15 RX ORDER — METHYLERGONOVINE MALEATE 0.2 MG/ML
0.2 INJECTION INTRAVENOUS ONCE AS NEEDED
Status: DISCONTINUED | OUTPATIENT
Start: 2024-03-15 | End: 2024-03-18 | Stop reason: HOSPADM

## 2024-03-15 RX ORDER — METHYLERGONOVINE MALEATE 0.2 MG/ML
0.2 INJECTION INTRAVENOUS ONCE AS NEEDED
Status: DISCONTINUED | OUTPATIENT
Start: 2024-03-15 | End: 2024-03-15

## 2024-03-15 RX ORDER — IBUPROFEN 600 MG/1
600 TABLET ORAL EVERY 6 HOURS
Status: DISCONTINUED | OUTPATIENT
Start: 2024-03-16 | End: 2024-03-18 | Stop reason: HOSPADM

## 2024-03-15 RX ORDER — CARBOPROST TROMETHAMINE 250 UG/ML
250 INJECTION, SOLUTION INTRAMUSCULAR ONCE AS NEEDED
Status: DISCONTINUED | OUTPATIENT
Start: 2024-03-15 | End: 2024-03-15

## 2024-03-15 RX ORDER — HYDRALAZINE HYDROCHLORIDE 20 MG/ML
5 INJECTION INTRAMUSCULAR; INTRAVENOUS ONCE AS NEEDED
Status: DISCONTINUED | OUTPATIENT
Start: 2024-03-15 | End: 2024-03-15

## 2024-03-15 RX ORDER — LABETALOL HYDROCHLORIDE 5 MG/ML
20 INJECTION, SOLUTION INTRAVENOUS ONCE AS NEEDED
Status: DISCONTINUED | OUTPATIENT
Start: 2024-03-15 | End: 2024-03-18 | Stop reason: HOSPADM

## 2024-03-15 RX ORDER — OXYTOCIN 10 [USP'U]/ML
10 INJECTION, SOLUTION INTRAMUSCULAR; INTRAVENOUS ONCE AS NEEDED
Status: DISCONTINUED | OUTPATIENT
Start: 2024-03-15 | End: 2024-03-18 | Stop reason: HOSPADM

## 2024-03-15 RX ORDER — BISACODYL 10 MG/1
10 SUPPOSITORY RECTAL DAILY PRN
Status: DISCONTINUED | OUTPATIENT
Start: 2024-03-15 | End: 2024-03-18 | Stop reason: HOSPADM

## 2024-03-15 RX ORDER — MORPHINE SULFATE 0.5 MG/ML
INJECTION, SOLUTION EPIDURAL; INTRATHECAL; INTRAVENOUS AS NEEDED
Status: DISCONTINUED | OUTPATIENT
Start: 2024-03-15 | End: 2024-03-15

## 2024-03-15 RX ORDER — DIPHENHYDRAMINE HYDROCHLORIDE 50 MG/ML
INJECTION INTRAMUSCULAR; INTRAVENOUS AS NEEDED
Status: DISCONTINUED | OUTPATIENT
Start: 2024-03-15 | End: 2024-03-15

## 2024-03-15 RX ORDER — LOPERAMIDE HYDROCHLORIDE 2 MG/1
4 CAPSULE ORAL EVERY 2 HOUR PRN
Status: DISCONTINUED | OUTPATIENT
Start: 2024-03-15 | End: 2024-03-18 | Stop reason: HOSPADM

## 2024-03-15 RX ORDER — TERBUTALINE SULFATE 1 MG/ML
0.25 INJECTION SUBCUTANEOUS ONCE AS NEEDED
Status: DISCONTINUED | OUTPATIENT
Start: 2024-03-15 | End: 2024-03-15

## 2024-03-15 RX ORDER — DIPHENHYDRAMINE HCL 25 MG
25 CAPSULE ORAL EVERY 4 HOURS PRN
Status: DISCONTINUED | OUTPATIENT
Start: 2024-03-15 | End: 2024-03-18 | Stop reason: HOSPADM

## 2024-03-15 RX ORDER — DIPHENHYDRAMINE HYDROCHLORIDE 50 MG/ML
25 INJECTION INTRAMUSCULAR; INTRAVENOUS EVERY 4 HOURS PRN
Status: DISCONTINUED | OUTPATIENT
Start: 2024-03-15 | End: 2024-03-18 | Stop reason: HOSPADM

## 2024-03-15 RX ORDER — TRANEXAMIC ACID 100 MG/ML
1000 INJECTION, SOLUTION INTRAVENOUS ONCE AS NEEDED
Status: DISCONTINUED | OUTPATIENT
Start: 2024-03-15 | End: 2024-03-15

## 2024-03-15 RX ORDER — HYDRALAZINE HYDROCHLORIDE 20 MG/ML
5 INJECTION INTRAMUSCULAR; INTRAVENOUS ONCE AS NEEDED
Status: DISCONTINUED | OUTPATIENT
Start: 2024-03-15 | End: 2024-03-18 | Stop reason: HOSPADM

## 2024-03-15 RX ORDER — FENTANYL CITRATE 50 UG/ML
INJECTION, SOLUTION INTRAMUSCULAR; INTRAVENOUS AS NEEDED
Status: DISCONTINUED | OUTPATIENT
Start: 2024-03-15 | End: 2024-03-15

## 2024-03-15 RX ORDER — BUTORPHANOL TARTRATE 2 MG/ML
1 INJECTION INTRAMUSCULAR; INTRAVENOUS
Status: DISCONTINUED | OUTPATIENT
Start: 2024-03-15 | End: 2024-03-15

## 2024-03-15 RX ORDER — OXYCODONE HYDROCHLORIDE 5 MG/1
5 TABLET ORAL EVERY 4 HOURS PRN
Status: DISCONTINUED | OUTPATIENT
Start: 2024-03-16 | End: 2024-03-18 | Stop reason: HOSPADM

## 2024-03-15 RX ORDER — ONDANSETRON HYDROCHLORIDE 2 MG/ML
4 INJECTION, SOLUTION INTRAVENOUS EVERY 6 HOURS PRN
Status: DISCONTINUED | OUTPATIENT
Start: 2024-03-15 | End: 2024-03-18 | Stop reason: HOSPADM

## 2024-03-15 RX ORDER — OXYTOCIN/0.9 % SODIUM CHLORIDE 30/500 ML
60 PLASTIC BAG, INJECTION (ML) INTRAVENOUS ONCE AS NEEDED
Status: DISCONTINUED | OUTPATIENT
Start: 2024-03-15 | End: 2024-03-18 | Stop reason: HOSPADM

## 2024-03-15 RX ORDER — PHENYLEPHRINE 10 MG/250 ML(40 MCG/ML)IN 0.9 % SOD.CHLORIDE INTRAVENOUS
CONTINUOUS PRN
Status: DISCONTINUED | OUTPATIENT
Start: 2024-03-15 | End: 2024-03-15

## 2024-03-15 RX ORDER — KETOROLAC TROMETHAMINE 30 MG/ML
INJECTION, SOLUTION INTRAMUSCULAR; INTRAVENOUS AS NEEDED
Status: DISCONTINUED | OUTPATIENT
Start: 2024-03-15 | End: 2024-03-15

## 2024-03-15 RX ORDER — LIDOCAINE 560 MG/1
1 PATCH PERCUTANEOUS; TOPICAL; TRANSDERMAL
Status: DISCONTINUED | OUTPATIENT
Start: 2024-03-15 | End: 2024-03-18 | Stop reason: HOSPADM

## 2024-03-15 RX ORDER — ONDANSETRON 4 MG/1
4 TABLET, FILM COATED ORAL EVERY 6 HOURS PRN
Status: DISCONTINUED | OUTPATIENT
Start: 2024-03-15 | End: 2024-03-15

## 2024-03-15 RX ORDER — SIMETHICONE 80 MG
80 TABLET,CHEWABLE ORAL 4 TIMES DAILY PRN
Status: DISCONTINUED | OUTPATIENT
Start: 2024-03-15 | End: 2024-03-18 | Stop reason: HOSPADM

## 2024-03-15 RX ORDER — CEFAZOLIN 1 G/1
INJECTION, POWDER, FOR SOLUTION INTRAVENOUS AS NEEDED
Status: DISCONTINUED | OUTPATIENT
Start: 2024-03-15 | End: 2024-03-15

## 2024-03-15 RX ORDER — ONDANSETRON 4 MG/1
4 TABLET, FILM COATED ORAL EVERY 6 HOURS PRN
Status: DISCONTINUED | OUTPATIENT
Start: 2024-03-15 | End: 2024-03-18 | Stop reason: HOSPADM

## 2024-03-15 RX ORDER — METOCLOPRAMIDE 10 MG/1
10 TABLET ORAL EVERY 6 HOURS PRN
Status: DISCONTINUED | OUTPATIENT
Start: 2024-03-15 | End: 2024-03-15

## 2024-03-15 RX ORDER — LABETALOL HYDROCHLORIDE 5 MG/ML
20 INJECTION, SOLUTION INTRAVENOUS ONCE AS NEEDED
Status: DISCONTINUED | OUTPATIENT
Start: 2024-03-15 | End: 2024-03-15

## 2024-03-15 RX ORDER — HYDROMORPHONE HYDROCHLORIDE 1 MG/ML
0.2 INJECTION, SOLUTION INTRAMUSCULAR; INTRAVENOUS; SUBCUTANEOUS EVERY 5 MIN PRN
Status: DISCONTINUED | OUTPATIENT
Start: 2024-03-15 | End: 2024-03-18 | Stop reason: HOSPADM

## 2024-03-15 RX ORDER — KETOROLAC TROMETHAMINE 30 MG/ML
30 INJECTION, SOLUTION INTRAMUSCULAR; INTRAVENOUS EVERY 6 HOURS
Status: COMPLETED | OUTPATIENT
Start: 2024-03-15 | End: 2024-03-16

## 2024-03-15 RX ORDER — NALOXONE HYDROCHLORIDE 0.4 MG/ML
0.1 INJECTION, SOLUTION INTRAMUSCULAR; INTRAVENOUS; SUBCUTANEOUS EVERY 5 MIN PRN
Status: DISCONTINUED | OUTPATIENT
Start: 2024-03-15 | End: 2024-03-18 | Stop reason: HOSPADM

## 2024-03-15 RX ORDER — NIFEDIPINE 10 MG/1
10 CAPSULE ORAL ONCE AS NEEDED
Status: DISCONTINUED | OUTPATIENT
Start: 2024-03-15 | End: 2024-03-15

## 2024-03-15 RX ORDER — BUTORPHANOL TARTRATE 1 MG/ML
1 INJECTION INTRAMUSCULAR; INTRAVENOUS
Status: DISCONTINUED | OUTPATIENT
Start: 2024-03-15 | End: 2024-03-15

## 2024-03-15 RX ORDER — NIFEDIPINE 10 MG/1
10 CAPSULE ORAL ONCE AS NEEDED
Status: DISCONTINUED | OUTPATIENT
Start: 2024-03-15 | End: 2024-03-18 | Stop reason: HOSPADM

## 2024-03-15 RX ORDER — CARBOPROST TROMETHAMINE 250 UG/ML
250 INJECTION, SOLUTION INTRAMUSCULAR ONCE AS NEEDED
Status: DISCONTINUED | OUTPATIENT
Start: 2024-03-15 | End: 2024-03-18 | Stop reason: HOSPADM

## 2024-03-15 RX ORDER — MISOPROSTOL 200 UG/1
800 TABLET ORAL ONCE AS NEEDED
Status: DISCONTINUED | OUTPATIENT
Start: 2024-03-15 | End: 2024-03-15

## 2024-03-15 RX ORDER — MISOPROSTOL 200 UG/1
800 TABLET ORAL ONCE AS NEEDED
Status: DISCONTINUED | OUTPATIENT
Start: 2024-03-15 | End: 2024-03-18 | Stop reason: HOSPADM

## 2024-03-15 RX ORDER — TRANEXAMIC ACID 100 MG/ML
1000 INJECTION, SOLUTION INTRAVENOUS ONCE AS NEEDED
Status: DISCONTINUED | OUTPATIENT
Start: 2024-03-15 | End: 2024-03-18 | Stop reason: HOSPADM

## 2024-03-15 RX ORDER — ENOXAPARIN SODIUM 100 MG/ML
40 INJECTION SUBCUTANEOUS EVERY 24 HOURS
Status: DISCONTINUED | OUTPATIENT
Start: 2024-03-15 | End: 2024-03-15

## 2024-03-15 RX ORDER — LOPERAMIDE HYDROCHLORIDE 2 MG/1
4 CAPSULE ORAL EVERY 2 HOUR PRN
Status: DISCONTINUED | OUTPATIENT
Start: 2024-03-15 | End: 2024-03-15

## 2024-03-15 RX ORDER — FAMOTIDINE 10 MG/ML
INJECTION INTRAVENOUS AS NEEDED
Status: DISCONTINUED | OUTPATIENT
Start: 2024-03-15 | End: 2024-03-15

## 2024-03-15 RX ORDER — SODIUM CHLORIDE, SODIUM LACTATE, POTASSIUM CHLORIDE, CALCIUM CHLORIDE 600; 310; 30; 20 MG/100ML; MG/100ML; MG/100ML; MG/100ML
125 INJECTION, SOLUTION INTRAVENOUS CONTINUOUS
Status: DISCONTINUED | OUTPATIENT
Start: 2024-03-15 | End: 2024-03-15

## 2024-03-15 RX ORDER — ADHESIVE BANDAGE
10 BANDAGE TOPICAL
Status: DISCONTINUED | OUTPATIENT
Start: 2024-03-15 | End: 2024-03-18 | Stop reason: HOSPADM

## 2024-03-15 RX ADMIN — MORPHINE SULFATE 0.15 MG: 0.5 INJECTION EPIDURAL; INTRATHECAL; INTRAVENOUS at 10:03

## 2024-03-15 RX ADMIN — Medication 0.64 MCG/KG/MIN: at 09:41

## 2024-03-15 RX ADMIN — HYDROMORPHONE HYDROCHLORIDE 0.2 MG: 1 INJECTION, SOLUTION INTRAMUSCULAR; INTRAVENOUS; SUBCUTANEOUS at 13:43

## 2024-03-15 RX ADMIN — SODIUM CHLORIDE, SODIUM LACTATE, POTASSIUM CHLORIDE, AND CALCIUM CHLORIDE: 600; 310; 30; 20 INJECTION, SOLUTION INTRAVENOUS at 09:17

## 2024-03-15 RX ADMIN — OXYTOCIN 600 MILLI-UNITS/MIN: 10 INJECTION, SOLUTION INTRAMUSCULAR; INTRAVENOUS at 10:14

## 2024-03-15 RX ADMIN — ACETAMINOPHEN 975 MG: 325 TABLET ORAL at 23:33

## 2024-03-15 RX ADMIN — HYDROMORPHONE HYDROCHLORIDE 0.2 MG: 1 INJECTION, SOLUTION INTRAMUSCULAR; INTRAVENOUS; SUBCUTANEOUS at 15:27

## 2024-03-15 RX ADMIN — SODIUM CHLORIDE, POTASSIUM CHLORIDE, SODIUM LACTATE AND CALCIUM CHLORIDE 125 ML/HR: 600; 310; 30; 20 INJECTION, SOLUTION INTRAVENOUS at 08:15

## 2024-03-15 RX ADMIN — FAMOTIDINE 20 MG: 10 INJECTION, SOLUTION INTRAVENOUS at 09:17

## 2024-03-15 RX ADMIN — DIPHENHYDRAMINE HYDROCHLORIDE 25 MG: 50 INJECTION INTRAMUSCULAR; INTRAVENOUS at 10:33

## 2024-03-15 RX ADMIN — KETOROLAC TROMETHAMINE 30 MG: 30 INJECTION, SOLUTION INTRAMUSCULAR at 11:08

## 2024-03-15 RX ADMIN — CEFAZOLIN 2 G: 1 INJECTION, POWDER, FOR SOLUTION INTRAMUSCULAR; INTRAVENOUS at 09:55

## 2024-03-15 RX ADMIN — FENTANYL CITRATE 15 MCG: 50 INJECTION, SOLUTION INTRAMUSCULAR; INTRAVENOUS at 10:03

## 2024-03-15 RX ADMIN — KETOROLAC TROMETHAMINE 30 MG: 30 INJECTION, SOLUTION INTRAMUSCULAR; INTRAVENOUS at 23:33

## 2024-03-15 RX ADMIN — ACETAMINOPHEN 975 MG: 325 TABLET ORAL at 17:17

## 2024-03-15 RX ADMIN — KETOROLAC TROMETHAMINE 30 MG: 30 INJECTION, SOLUTION INTRAMUSCULAR; INTRAVENOUS at 17:17

## 2024-03-15 RX ADMIN — ONDANSETRON 4 MG: 2 INJECTION INTRAMUSCULAR; INTRAVENOUS at 09:17

## 2024-03-15 RX ADMIN — DEXAMETHASONE SODIUM PHOSPHATE 4 MG: 4 INJECTION, SOLUTION INTRAMUSCULAR; INTRAVENOUS at 10:03

## 2024-03-15 RX ADMIN — ENOXAPARIN SODIUM 40 MG: 100 INJECTION SUBCUTANEOUS at 23:34

## 2024-03-15 SDOH — HEALTH STABILITY: MENTAL HEALTH: HAVE YOU USED ANY SUBSTANCES (CANABIS, COCAINE, HEROIN, HALLUCINOGENS, INHALANTS, ETC.) IN THE PAST 12 MONTHS?: NO

## 2024-03-15 SDOH — SOCIAL STABILITY: SOCIAL INSECURITY: ABUSE SCREEN: ADULT

## 2024-03-15 SDOH — SOCIAL STABILITY: SOCIAL INSECURITY: VERBAL ABUSE: DENIES

## 2024-03-15 SDOH — HEALTH STABILITY: MENTAL HEALTH: CURRENT SMOKER: 0

## 2024-03-15 SDOH — SOCIAL STABILITY: SOCIAL INSECURITY: DOES ANYONE TRY TO KEEP YOU FROM HAVING/CONTACTING OTHER FRIENDS OR DOING THINGS OUTSIDE YOUR HOME?: NO

## 2024-03-15 SDOH — HEALTH STABILITY: MENTAL HEALTH: SUICIDAL BEHAVIOR (LIFETIME): NO

## 2024-03-15 SDOH — HEALTH STABILITY: MENTAL HEALTH: HAVE YOU USED ANY PRESCRIPTION DRUGS OTHER THAN PRESCRIBED IN THE PAST 12 MONTHS?: NO

## 2024-03-15 SDOH — SOCIAL STABILITY: SOCIAL INSECURITY: HAS ANYONE EVER THREATENED TO HURT YOUR FAMILY OR YOUR PETS?: NO

## 2024-03-15 SDOH — HEALTH STABILITY: MENTAL HEALTH: WERE YOU ABLE TO COMPLETE ALL THE BEHAVIORAL HEALTH SCREENINGS?: YES

## 2024-03-15 SDOH — SOCIAL STABILITY: SOCIAL INSECURITY: HAVE YOU HAD THOUGHTS OF HARMING ANYONE ELSE?: NO

## 2024-03-15 SDOH — SOCIAL STABILITY: SOCIAL INSECURITY: PHYSICAL ABUSE: DENIES

## 2024-03-15 SDOH — HEALTH STABILITY: MENTAL HEALTH: NON-SPECIFIC ACTIVE SUICIDAL THOUGHTS (PAST 1 MONTH): NO

## 2024-03-15 SDOH — SOCIAL STABILITY: SOCIAL INSECURITY: ARE YOU OR HAVE YOU BEEN THREATENED OR ABUSED PHYSICALLY, EMOTIONALLY, OR SEXUALLY BY ANYONE?: NO

## 2024-03-15 SDOH — SOCIAL STABILITY: SOCIAL INSECURITY: ARE THERE ANY APPARENT SIGNS OF INJURIES/BEHAVIORS THAT COULD BE RELATED TO ABUSE/NEGLECT?: NO

## 2024-03-15 SDOH — SOCIAL STABILITY: SOCIAL INSECURITY: DO YOU FEEL ANYONE HAS EXPLOITED OR TAKEN ADVANTAGE OF YOU FINANCIALLY OR OF YOUR PERSONAL PROPERTY?: NO

## 2024-03-15 SDOH — ECONOMIC STABILITY: HOUSING INSECURITY: DO YOU FEEL UNSAFE GOING BACK TO THE PLACE WHERE YOU ARE LIVING?: NO

## 2024-03-15 SDOH — HEALTH STABILITY: MENTAL HEALTH: WISH TO BE DEAD (PAST 1 MONTH): NO

## 2024-03-15 ASSESSMENT — PAIN SCALES - GENERAL
PAINLEVEL_OUTOF10: 7
PAINLEVEL_OUTOF10: 7
PAINLEVEL_OUTOF10: 5 - MODERATE PAIN
PAINLEVEL_OUTOF10: 7
PAINLEVEL_OUTOF10: 5 - MODERATE PAIN
PAINLEVEL_OUTOF10: 5 - MODERATE PAIN

## 2024-03-15 ASSESSMENT — LIFESTYLE VARIABLES
HOW OFTEN DO YOU HAVE A DRINK CONTAINING ALCOHOL: NEVER
HOW MANY STANDARD DRINKS CONTAINING ALCOHOL DO YOU HAVE ON A TYPICAL DAY: PATIENT DOES NOT DRINK
HOW OFTEN DO YOU HAVE 6 OR MORE DRINKS ON ONE OCCASION: NEVER
AUDIT-C TOTAL SCORE: 0
AUDIT-C TOTAL SCORE: 0
SKIP TO QUESTIONS 9-10: 1

## 2024-03-15 ASSESSMENT — PAIN DESCRIPTION - DESCRIPTORS
DESCRIPTORS: SORE
DESCRIPTORS: SORE

## 2024-03-15 ASSESSMENT — PATIENT HEALTH QUESTIONNAIRE - PHQ9
1. LITTLE INTEREST OR PLEASURE IN DOING THINGS: NOT AT ALL
SUM OF ALL RESPONSES TO PHQ9 QUESTIONS 1 & 2: 0
2. FEELING DOWN, DEPRESSED OR HOPELESS: NOT AT ALL

## 2024-03-15 ASSESSMENT — PAIN - FUNCTIONAL ASSESSMENT
PAIN_FUNCTIONAL_ASSESSMENT: 0-10
PAIN_FUNCTIONAL_ASSESSMENT: 0-10

## 2024-03-15 ASSESSMENT — PAIN DESCRIPTION - LOCATION
LOCATION: ABDOMEN
LOCATION: ABDOMEN

## 2024-03-15 NOTE — L&D DELIVERY NOTE
OB Delivery Note  3/15/2024  Dot Casey  23 y.o.   , Low Transverse      Date: 3/15/2024  OR Location: [facility]    Name: Dot Casey YOB: 2000, Age: 23 y.o., MRN: [id number], Sex: female    Diagnosis  Pre-op Diagnosis  1) IUP at 36w0d  2) History of prior uterine incision  3) Elevated blood pressure in pregnancy Post-op Diagnosis  1) IUP at 36w0d  2) History of prior uterine incision  3) Elevated blood pressure in pregnancy     Procedures   Section  90729 - ND  DELIVERY ONLY W/POSTPARTUM CARE      Surgeons      * Angel Tsang - Primary    Resident/Fellow/Other Assistant:  Surgeon(s) and Role:  Kvng Devine MD - Resident PGY4  Cat Sheldon MD - Resident PGY1    Procedure Summary  Anesthesia: * No anesthesia type entered *  ASA: II  Anesthesia Staff: Anesthesiologist: Ladarius Estrada DO  CRNA: ETHAN Talbot-CRNA  Estimated Blood Loss: 973 mL  Intra-op Medications:   Administrations occurring from 0720 to 1121 on 03/15/24:   Medication Name Total Dose   lactated Ringer's infusion 387.5 mL              Anesthesia Record               Intraprocedure I/O Totals          Intake    LR 1500.00 mL    Oxytocin Drip 0.00 mL    The total shown is the total volume documented since Anesthesia Start was filed.    Phenylephrine Drip 0.00 mL    The total shown is the total volume documented since Anesthesia Start was filed.    Total Intake 1500 mL       Output    Urine 60 mL    Total Blood Loss - Surgical Delivery (mL) 973 mL    Total Output 1033 mL       Net    Net Volume 467 mL       Other (could not be determined as input or output)    Surgical Delivery Estimated Blood Loss (mL) 973          Specimen: Placenta    Staff:   Circulator: Wilma Raines RN  Scrub Person: Cinda Cardoza     Informed Consent:  The risks, benefits, complications, and alternatives were discussed with the patient. The patient understood that the risks of  section include, but are not limited  to: injury to nearby structures or organs, infection, blood loss and possible need for transfusion, and potential need for more surgery including hysterectomy. The patient stated understanding and desired to proceed. All questions were answered. The site of surgery was properly noted and marked. The patient was identified as Dot Casey and the procedure verified as a  delivery. A Time Out was held and the above information confirmed.    Procedure Details:  Findings: Normal appearing gravid uterus, left fallopian tubes, and left ovary. Right fallopian tube and ovary absent. Filmy adhesions between omentum and right uterus. Amniotic fluid clear, female infant in vertex presentation.     Indication for Procedure: 24 yo  at 36w0d for scheduled pCS given history of prior uterine surgery. Patient had R cornual wedge resection for ectopic pregnancy in May 2023.    Procedure: Pt was taken to the OR where combined spinal epidural anesthesia was administered. She was then placed in the dorsal supine position with a left lateral tilt. A cortés catheter was placed, SCDs were applied, and a vaginal prep was performed. A pre-procedure time out was performed.  The pt was given prophylactic dose of IV antibiotics.  She was then prepped and draped in the usual sterile fashion. A Pfannenstiel skin incision was made with the scalpel through the skin and subcutaneous fat to the underlying fascial layer.  The fascia was incised on either side of the midline with the scalpel and the incision was extended bilaterally. The rectus muscle were divided in the midline, the peritoneum was then identified and entered bluntly and incision extended laterally taking care to avoid underlying viscera.  The bladder blade was inserted.     A low transverse uterine incision was made with the scalpel, the uterine cavity was entered, and the hysterotomy was extended bilaterally by cephalocaudal stretch.  The infant was delivered  atraumatically, the cord was clamped and cut and infant was handed off to awaiting nursing staff.  A cord blood sample was collected. The placenta was then expressed. The uterus was exteriorized and cleared of all clot and debris. The uterine incision was repaired using a running locked stitch of 0-Vicryl. Additional figure of eight stitches of the same suture were placed. Small bleeders were cauterized. Adhesions between the omentum and right uterine wall were dissected with electrocautery.     The uterus was then placed back inside the pelvis. The hysterotomy was again evaluated and figure of eight stitches of 0-Vicryl were placed. Small bleeders were cauterized. Katie was placed across the hysterotomy, which was then found to be hemostatic.  The underside of the fascia and bladder and the rectus muscles were evaluated, and small bleeders were cauterized until the area was found to be hemostatic. The fascia was closed using a running stitch of 0-PDS on a loop. The subcutaneous layer was irrigated, small bleeders were cauterized, and the subcutaneous layer was re-approximated using a running stitch of 2-0 Monocryl. The skin was closed with 4-0 Vicryl. All counts were correct per nursing staff, the patient tolerated the procedure well.  Dr. Tsang was present for all critical portions of the procedure.  The patient was taken back to the recovery room in stable condition.      Gestational Age: 36w0d  /Para:   Quantitative Blood Loss: Admission to Discharge: 973 mL (3/15/2024  6:40 AM - 3/15/2024  5:46 PM)    Melanie Casey [[id number]]      Labor Events    Rupture date/time: 3/15/2024 1013  Rupture type: Artificial  Fluid color: Clear  Fluid odor: None  Labor type:  Without Labor  Labor allowed to proceed with plans for an attempted vaginal birth?: No  Complications: None       Labor Length    3rd stage: 0h 01m       Placenta    Placenta delivery date/time: 3/15/2024 1015  Placenta removal:  Expressed  Placenta appearance: Intact  Placenta disposition: pathology       Cord    Vessels: 3 vessels  Complications: None, Nuchal  Nuchal intervention: reduced  Nuchal cord description: loose nuchal cord  Number of loops: 1  Delayed cord clamping?: Yes  Cord clamped date/time: 3/15/2024 1015  Cord blood disposition: Lab  Gases sent?: No  Stem cell collection (by provider): No       Lacerations    Episiotomy: None  Perineal laceration: None  Other lacerations?: No       Anesthesia    Method: Combined spinal-epidural       Operative Delivery    Forceps attempted?: No  Vacuum extractor attempted?: No       Shoulder Dystocia    Shoulder dystocia present?: No       [place] Delivery    Birth date/time: 3/15/2026 10:14:00  Delivery type: , Low Transverse   categorization: primary   priority: routine  Indications for : Prior Uterine Surgery  Incision type: low transverse  Complications: None       Resuscitation    Method: Suctioning, Tactile stimulation, Continuous positive airway pressure (CPAP)       Apgars    Living status: Living  Apgar Component Scores:  1 min.:  5 min.:  10 min.:  15 min.:  20 min.:    Skin color:  0  0       Heart rate:  2  2       Reflex irritability:  2  2       Muscle tone:  2  2       Respiratory effort:  1  0       Total:  7  6       Apgars assigned by: CORINNE CASTRO       Delivery Providers    Delivering clinician: Angel Tsang MD   Provider Role    Wilma Raines RN Delivery Nurse    Mehdi Gipson RN Nursery Nurse    Cat Sheldon MD Resident    Kvng Devine MD Resident                 Cat Sheldon MD PGY1

## 2024-03-15 NOTE — CARE PLAN
The patient's goals for the shift include get rest, try to eat    The clinical goals for the shift include pain control    Patient remained free from falls/injury throughout shift. VSS, lochia WNL, and pain well controlled with scheduled tylenol and toradol. No s/sx of infection or PPH at this time. Patient resting comfortably in bed and declines needs at this time.

## 2024-03-15 NOTE — ANESTHESIA PROCEDURE NOTES
CSE Block    Patient location during procedure: OR  Start time: 3/15/2024 9:32 AM  End time: 3/15/2024 9:47 AM  Reason for block: primary anesthetic}  Staffing  Performed: CRNA   Authorized by: Ladarius Estrada DO    Performed by: EVONNE Talbot    Preanesthetic Checklist  Completed: patient identified, IV checked, risks and benefits discussed, surgical consent, monitors and equipment checked, pre-op evaluation, timeout performed and sterile techniques followed  Block Timeout  RN/Licensed healthcare professional reads aloud to the Anesthesia provider and entire team: Patient identity, procedure with side and site, patient position, and as applicable the availability of implants/special equipment/special requirements.  Patient on coagulant treatment: no  Timeout performed at: 3/15/2024 9:32 AM    CSE Block  Patient position: sitting  Prep: ChloraPrep  Sterility prep: cap, drape, gloves and mask  Sedation level: no sedation  Patient monitoring: blood pressure, continuous pulse oximetry and heart rate  Approach: midline  Local numbing: lidocaine 1% to skin and subcutaneous tissues  Vertebral space: lumbar  L4-5  Guidance: landmark technique    Epidural Needle  MELCHOR technique: saline  Needle type: Tuohy   Needle gauge: 17 G  Needle length: 8.9 cm  Needle insertion depth: 6 cm  Spinal Needle  Needle type: pencil-point   Needle gauge: 25 G  Needle length: 12.7 cm  Free flow CSF: yes  Epidural Catheter  Catheter type: multi-orifice  Catheter size: 19 G  Catheter at skin depth: 10 cm  Catheter securement method: clear occlusive dressing and liquid medical adhesive              Assessment  Sensory level: T4 bilateral  Block outcome: Allis test negative  Number of attempts: 1  Procedure assessment: patient tolerated procedure well with no immediate complications

## 2024-03-15 NOTE — ANESTHESIA PREPROCEDURE EVALUATION
Patient: Dot Casey    Evaluation Method: In-person visit    Procedure Information       Date/Time: 03/15/24 0830    Procedure:  Section    Location: [facility] / [facility]    Surgeons: Alicia Laguna MD            Relevant Problems   Anesthesia (within normal limits)      Cardiovascular (within normal limits)      Endocrine   (+) Goiter      GI   (+) Gastroesophageal reflux disease      /Renal (within normal limits)      Neuro/Psych (within normal limits)      Pulmonary (within normal limits)      GI/Hepatic (within normal limits)      Hematology   (+) Anemia      Musculoskeletal (within normal limits)      Infectious Disease (within normal limits)       Clinical information reviewed:   Tobacco  Allergies  Meds   Med Hx  Surg Hx   Fam Hx  Soc Hx        NPO Detail:  No data recorded     OB/Gyn Evaluation    Present Pregnancy    Patient is pregnant now.   Obstetric History    : patient h/o ectopic x2, cornal wedge resection.              Physical Exam    Airway  Mallampati: II  TM distance: >3 FB  Neck ROM: full     Cardiovascular    Dental - normal exam     Pulmonary    Abdominal            Anesthesia Plan    History of general anesthesia?: yes  History of complications of general anesthesia?: no    ASA 2     CSE     The patient is not a current smoker.    Anesthetic plan and risks discussed with patient and spouse.  Use of blood products discussed with patient and spouse who.    Plan discussed with CRNA.

## 2024-03-15 NOTE — H&P
Obstetrical Admission History and Physical     Dot Casey is a 23 y.o.  at 36w0d. MILTON: 2024, by 7 week Ultrasound admitted for pCS in the s/o prior cornual wedge resection. Estimated fetal weight: 6.5#. She has had prenatal care with [facility] .    Chief Complaint: No chief complaint on file.    Assessment/Plan    For pCS  - admitted, consented, scanned for cephalic  - Discussed risks and benefits of  delivery including bleeding, infection, injury to surrounding structures.   - type & screen, CBC collected 3/13. T&C x1 unit pRBC  - epidural/pain management per anesthesia  - PPBC: OCPs at 6wks postpartum    Elevated BP in pregnancy  - at 13wk visit  - No hx of cHTN, no HDP dx  - HELLP labs neg, P:C  0.09    Fetal wellbeing  - CEFM, currently Cat I  - GBS neg. No indication For PCN ppx     Seen & Discussed with Dr. Sharan Sheldon MD, PGY-1      Subjective   Good fetal movement. Denies vaginal bleeding., C/O of occasional contractions., Denies leaking of fluid.       Indication for Primary  Section  previous uterine incision r cornual wedge resection    Pregnancy notable for:  - elevated BP at 13wks. No HTN dx.   - R cornual wedge resection  for ectopic     Obstetrical History   OB History    Para Term  AB Living   7 2 2   4 1   SAB IAB Ectopic Multiple Live Births   2   2   2      # Outcome Date GA Lbr Brent/2nd Weight Sex Delivery Anes PTL Lv   7 Current            6 Ectopic 23           5 Ectopic 2022           4 SAB 2020           3 Term 18 39w3d  3.232 kg F Vag-Spont  N JACINTA   2 SAB 2018           1 Term 16 39w6d  3.005 kg F Vag-Spont EPI N DEC       Past Medical History  Past Medical History:   Diagnosis Date    Gonorrhea     Nontoxic goiter, unspecified 2018    Goiter    Unspecified ovarian cyst, unspecified side 2018    Simple ovarian cyst    Urogenital trichomoniasis         Past Surgical History   Past Surgical  History:   Procedure Laterality Date    ECTOPIC PREGNANCY SURGERY Right 05/14/2023    laparoscopic resection of rt cornual pregnancy    LAPAROSCOPY DIAGNOSTIC / BIOPSY / ASPIRATION / LYSIS  12/29/2022    ruptured ectopic, R tubal remnant excision    OVARIAN CYST REMOVAL  2016    Ovarian Cystectomy - mature teratoma    SALPINGECTOMY Right 07/20/2018    RSO, 14 cm cyst       Social History  Social History     Tobacco Use    Smoking status: Former     Types: Cigarettes    Smokeless tobacco: Never   Substance Use Topics    Alcohol use: Not Currently     Comment: prior hx 1 btl wine 2-3 x/wk, 2 btls-wkend     Substance and Sexual Activity   Drug Use Not Currently    Types: Marijuana    Comment: stopped 7/202       Allergies  Patient has no known allergies.     Medications  Medications Prior to Admission   Medication Sig Dispense Refill Last Dose    Prenatal 28 mg iron- 800 mcg tablet TAKE ONE TABET BY MOUTH ONCE DAILY       prenatal vitamin, iron-folic, (prenatal vit no.130-iron-folic) 27 mg iron-800 mcg folic acid tablet Take 1 tablet by mouth once daily. 90 tablet 3        Objective    Last Vitals  Temp Pulse Resp BP MAP O2 Sat   36.8 °C (98.2 °F) 95 18 119/77         Physical Examination  General: Lying in bed in NAD  Skin: No rashes/lesions/erythema  Neuro: Awake, alert, conversational  CV: Regular rate  Respiratory: Even and unlabored on RA  Extremities: No edema, discoloration, or pain in BLE  Psych: appropriate mood and affect    Fetal Assessment  Movement: Present  Mode: External US  Baseline Fetal Heart Rate (bpm): 135 bpm  Baseline Classification: Normal  Variability: Moderate (Between 6 and 25 BPM)  Pattern: Accelerations  FHR Category: Category I             Lab Review  Lab Results   Component Value Date    WBC 11.6 (H) 03/13/2024    HGB 11.7 (L) 03/13/2024    HCT 35.4 (L) 03/13/2024    MCV 95 03/13/2024     03/13/2024

## 2024-03-15 NOTE — ANESTHESIA POSTPROCEDURE EVALUATION
Patient: Dot Casey    Procedure Summary       Date: 03/15/24 Room / Location: [facility] 04 / Virtual [facility] 2 OB    Anesthesia Start: 920 Anesthesia Stop:     Procedure:  Section Diagnosis:       Labor and delivery, indication for care      (Labor and delivery, indication for care [O75.9])    Surgeons: Alicia Laguna MD Responsible Provider: Ladarius Estrada DO    Anesthesia Type: CSE ASA Status: 2            Anesthesia Type: CSE    Vitals Value Taken Time   /62 03/15/24 1124   Temp 36 03/15/24 1126   Pulse 83 03/15/24 1124   Resp 20 03/15/24 1126   SpO2 99 % 03/15/24 1124       Anesthesia Post Evaluation    Patient location during evaluation: floor  Patient participation: complete - patient participated  Level of consciousness: awake and alert  Pain management: adequate  Airway patency: patent  Cardiovascular status: acceptable and hemodynamically stable  Respiratory status: acceptable and room air  Hydration status: acceptable  Postoperative Nausea and Vomiting: none        No notable events documented.

## 2024-03-16 PROBLEM — D62 ABLA (ACUTE BLOOD LOSS ANEMIA): Status: ACTIVE | Noted: 2024-03-16

## 2024-03-16 LAB
ERYTHROCYTE [DISTWIDTH] IN BLOOD BY AUTOMATED COUNT: 13 % (ref 11.5–14.5)
HCT VFR BLD AUTO: 25.3 % (ref 36–46)
HGB BLD-MCNC: 8.6 G/DL (ref 12–16)
MCH RBC QN AUTO: 31.9 PG (ref 26–34)
MCHC RBC AUTO-ENTMCNC: 34 G/DL (ref 32–36)
MCV RBC AUTO: 94 FL (ref 80–100)
NRBC BLD-RTO: 0.1 /100 WBCS (ref 0–0)
PLATELET # BLD AUTO: 206 X10*3/UL (ref 150–450)
RBC # BLD AUTO: 2.7 X10*6/UL (ref 4–5.2)
WBC # BLD AUTO: 17.6 X10*3/UL (ref 4.4–11.3)

## 2024-03-16 PROCEDURE — 2500000004 HC RX 250 GENERAL PHARMACY W/ HCPCS (ALT 636 FOR OP/ED): Performed by: NURSE PRACTITIONER

## 2024-03-16 PROCEDURE — 1210000001 HC SEMI-PRIVATE ROOM DAILY

## 2024-03-16 PROCEDURE — 2500000001 HC RX 250 WO HCPCS SELF ADMINISTERED DRUGS (ALT 637 FOR MEDICARE OP): Performed by: NURSE PRACTITIONER

## 2024-03-16 PROCEDURE — 85027 COMPLETE CBC AUTOMATED: CPT | Performed by: NURSE PRACTITIONER

## 2024-03-16 PROCEDURE — 36415 COLL VENOUS BLD VENIPUNCTURE: CPT | Performed by: NURSE PRACTITIONER

## 2024-03-16 RX ADMIN — OXYCODONE HYDROCHLORIDE 5 MG: 5 TABLET ORAL at 18:33

## 2024-03-16 RX ADMIN — ACETAMINOPHEN 975 MG: 325 TABLET ORAL at 19:52

## 2024-03-16 RX ADMIN — KETOROLAC TROMETHAMINE 30 MG: 30 INJECTION, SOLUTION INTRAMUSCULAR; INTRAVENOUS at 05:39

## 2024-03-16 RX ADMIN — IBUPROFEN 600 MG: 600 TABLET, FILM COATED ORAL at 13:14

## 2024-03-16 RX ADMIN — ACETAMINOPHEN 975 MG: 325 TABLET ORAL at 05:40

## 2024-03-16 RX ADMIN — ACETAMINOPHEN 975 MG: 325 TABLET ORAL at 13:14

## 2024-03-16 RX ADMIN — IBUPROFEN 600 MG: 600 TABLET, FILM COATED ORAL at 19:52

## 2024-03-16 ASSESSMENT — PAIN DESCRIPTION - DESCRIPTORS
DESCRIPTORS: SORE
DESCRIPTORS: ACHING;BURNING
DESCRIPTORS: SORE

## 2024-03-16 ASSESSMENT — PAIN DESCRIPTION - LOCATION
LOCATION: ABDOMEN

## 2024-03-16 ASSESSMENT — PAIN SCALES - GENERAL
PAINLEVEL_OUTOF10: 5 - MODERATE PAIN

## 2024-03-16 NOTE — PROGRESS NOTES
Postpartum Progress Note    Assessment/Plan   Dot Casey is a 23 y.o., , who delivered at 36w0d gestation and is now postpartum day 1.    s/p LTCS on 3/15,   - continue routine postoperative care  - pain well controlled, transition to PO meds   - Hgb   Results from last 7 days   Lab Units 24  0610 24  0920   HEMOGLOBIN g/dL 8.6* 11.7*     - DVT Score: 6, for ppx lovenox    Acute blood loss anemia  - Hgb trend as above, AM CBC for stability  - asymptomatic, VSS; for PO Fe on DC  - continue to monitor for s/sx anemia      Elevated BP without diagnosis of HDP  - Mild range BP @ 13 wga  - Normotensive  - Baseline HELLP labs negative  - BP cuff for home    Maternal Well-Being  - emotional support provided  - bonding with infant    Lapel Feeding  - Pt is strictly formula feeding and was counseled on non-pharmacologic methods of suppressing lactation including avoiding nipple/breast stimulation, avoiding heat application, applying ice, and wearing a supportive bra. Pt also counseled on s/sx of engorgement and mastitis.     Contraception  - Defer discussion to POD2    Dispo  - anticipate d/c on POD #3 if meeting all post-op and postpartum milestones  - for f/u 2wks with Primary OB provider      Active Problems:    Elevated blood pressure affecting pregnancy, antepartum     delivery delivered    Gastroesophageal reflux disease    Anemia    ABLA (acute blood loss anemia)    Pregnancy Problems (from 23 to present)       Problem Noted Resolved    Uterine scar from previous surgery affecting pregnancy 2023 by Rowena Cade MD No    Priority:  High      Overview Signed 2023 10:31 AM by Rowena Cade MD     RIGHT CORNUAL WEDGE RESECTION 2023 FOR ECTOPIC  - SCHEDULE FOR  BY 37 WGA         Labor and delivery indication for care or intervention 3/15/2024 by Cat Sheldon MD No    Priority:  Medium      Loss of infant 2023 by Tali Colon MD No     Priority:  Medium      Overview Addendum 12/4/2023  9:37 AM by Tali Colon MD     Infant killed by acquaintance of prior FOB          Elevated blood pressure affecting pregnancy, antepartum 12/4/2023 by Tali Colon MD No    Priority:  Medium      Overview Addendum 1/19/2024 12:01 PM by Braydon Sanders MD     X1 MR /90 at 13w  P:C 0.09 12/4  CMP with second trimester labs          34 weeks gestation of pregnancy 10/6/2023 by Quintin Chopra No    Priority:  Medium      Overview Addendum 3/3/2024  7:27 PM by Carmina Garcia MD     Desired provider for birth: [x] Physician  [x] Dated by: 7w US  [x] Initial BMI: 22  [x] Prenatal Labs: WNL  [x] Rh status: O+  [x] Genetic Screening:  not ordered  [x] Baby ASA: not prescribed    [x] Anatomy US: WNL 11/17  [x] Fetal Sex: female  [x] 1hr GCT at 24-28wks: 2/12 wnl  [x] 3 hr GTT (if indicated): n/a    [x] Tdap (27-36wks): 1/19/24  [x] Flu Shot: declined 12/4, and 2/12    [x] Breastfeeding: yes  [x] Postpartum Birth control method: OCPs at 6 weeks   [] Labor Support:   [x] GBS at 36 wks: neg 2/29 (33.6wga) in s/o of early delivery planning for hx of cornual wedge resection  [x] del plan - CS @36-37wga; h/o ectopic x2 - s/p cornual ectopic wedge resection 5/2023 - scheduled for 3/15  **FOB wants to do skin to skin in OR and cut cord**         Status post ectopic pregnancy 10/6/2023 by Quintin Chopra No    Priority:  Medium      Overview Addendum 3/14/2024  1:48 PM by Cat Sheldon MD     X2, one cornual with wedge resection and one in tubal remnant on right     - 2018 RSO for 14cm ovarian cyst involving entire ovary  - 2016 ovarian cyst, s/p lap cystectomy 2 months PP, path c/w mature teratoma  - 12/2022 ruptured ectopic in remnant of right tube   - 5/2023 wedge resection of R cornual ectopic          History of surgical procedure 12/4/2023 by Tali Colon MD 3/14/2024 by Cat Sheldon MD    Overview Signed 12/4/2023  9:36 AM by Tali Colon MD     - 2018  RSO for 14cm ovarian cyst involving entire ovary  -  ovarian cyst, s/p lap cystectomy 2 months PP, path c/w mature teratoma  - 2022 ruptured ectopic in remnant of right tube   - 2023 wedge resection of R cornual ectopic                Hospital course: no complications   section delivery  Patient is not breastfeedingThe patient's blood type is O POS. The baby's blood type is O POS . Rhogam is not indicated.    Subjective   Her pain is well controlled with current medications  She is passing flatus  She is ambulating well  She is tolerating a Adult diet Regular  She reports no breast or nursing problems  She denies emotional concerns today   Her plan for contraception is unknown     Denies HA, SOB, RUQ pain, vision changes.     Objective   Allergies:   Patient has no known allergies.         Last Vitals:  Temp Pulse Resp BP MAP Pulse Ox   36.4 °C (97.5 °F) 88 18 107/65   99 %     Vitals Min/Max Last 24 Hours:  Temp  Min: 36.1 °C (97 °F)  Max: 36.4 °C (97.5 °F)  Pulse  Min: 66  Max: 92  Resp  Min: 18  Max: 18  BP  Min: 102/67  Max: 112/73    Intake/Output:     Intake/Output Summary (Last 24 hours) at 3/16/2024 1940  Last data filed at 3/16/2024 0400  Gross per 24 hour   Intake --   Output 900 ml   Net -900 ml       Physical Exam:  Incision: healing well, no drainage, no erythema, no swelling, well approximated.  General: Examination reveals a well developed, well nourished, female, in no acute distress. She is alert and cooperative.  Lungs: symmetrical, non-labored breathing.  Cardiac: warm, well-perfused.  Abdomen: soft, non-tender.  Fundus: firm, below umbilicus, and nontender.  Extremities: no redness or tenderness in the calves or thighs.  Neurological: alert, oriented, normal speech, no focal findings or movement disorder noted.     Lab Data:  Labs in chart were reviewed.

## 2024-03-16 NOTE — CARE PLAN
The patient's goals for the shift include rest, visit NICU    The clinical goals for the shift include pain control    Patient meeting PP milestones. Fundus is firm and bleeding is can't. Clifton catheter removed and patient is voiding without issue. Pain is managed with prescribed medications and abdominal binder. She is ambulating independently and visited the NICU overnight. Patient denies any needs at this time.

## 2024-03-16 NOTE — ANESTHESIA POSTPROCEDURE EVALUATION
Patient: Dot Casey    Procedure Summary       Date: 03/15/24 Room / Location: [facility] 04 / Virtual [facility] 2 OB    Anesthesia Start: 920 Anesthesia Stop:     Procedure:  Section Diagnosis:       Labor and delivery, indication for care      (Labor and delivery, indication for care [O75.9])    Surgeons: Angel Tsang MD Responsible Provider: Ladarius Estrada DO    Anesthesia Type: CSE ASA Status: 2            Anesthesia Type: CSE    Vitals Value Taken Time   /70 24 0445   Temp 36.3 °C (97.3 °F) 24 0445   Pulse 76 24 0445   Resp 18 24 0445   SpO2 100 % 24 0445       Anesthesia Post Evaluation    Patient location during evaluation: floor  Patient participation: complete - patient participated  Level of consciousness: awake  Pain management: adequate  Airway patency: patent  Cardiovascular status: hypotensive  Respiratory status: acceptable  Hydration status: acceptable  Postoperative Nausea and Vomiting: none  Comments: Neuraxial site assessed. No visible redness or swelling or drainage. Patient able to ambulate and move all extremities without difficulty. Able to void. No complaints of nausea/vomiting. Tolerating PO intake well. No s/sx of PDPH.          No notable events documented.

## 2024-03-16 NOTE — CARE PLAN
The patient's goals for the shift include rest, visit NICU    The clinical goals for the shift include pain control

## 2024-03-16 NOTE — CARE PLAN
The patient's goals for the shift include visit nicu    The clinical goals for the shift include pain 4/10 or less    Over the shift, the patient did make progress toward the following goals. Barriers to progression include none. Recommendations to address these barriers include returning to the floor for pain meds as needed.

## 2024-03-17 LAB
BLOOD EXPIRATION DATE: NORMAL
DISPENSE STATUS: NORMAL
ERYTHROCYTE [DISTWIDTH] IN BLOOD BY AUTOMATED COUNT: 13.4 % (ref 11.5–14.5)
HCT VFR BLD AUTO: 25.3 % (ref 36–46)
HGB BLD-MCNC: 8.5 G/DL (ref 12–16)
MCH RBC QN AUTO: 32.4 PG (ref 26–34)
MCHC RBC AUTO-ENTMCNC: 33.6 G/DL (ref 32–36)
MCV RBC AUTO: 97 FL (ref 80–100)
NRBC BLD-RTO: 0 /100 WBCS (ref 0–0)
PLATELET # BLD AUTO: 202 X10*3/UL (ref 150–450)
PRODUCT BLOOD TYPE: 5100
PRODUCT CODE: NORMAL
RBC # BLD AUTO: 2.62 X10*6/UL (ref 4–5.2)
UNIT ABO: NORMAL
UNIT NUMBER: NORMAL
UNIT RH: NORMAL
UNIT VOLUME: 293
WBC # BLD AUTO: 10.5 X10*3/UL (ref 4.4–11.3)
XM INTEP: NORMAL

## 2024-03-17 PROCEDURE — 2500000001 HC RX 250 WO HCPCS SELF ADMINISTERED DRUGS (ALT 637 FOR MEDICARE OP): Performed by: NURSE PRACTITIONER

## 2024-03-17 PROCEDURE — 36415 COLL VENOUS BLD VENIPUNCTURE: CPT

## 2024-03-17 PROCEDURE — 2500000004 HC RX 250 GENERAL PHARMACY W/ HCPCS (ALT 636 FOR OP/ED): Performed by: NURSE PRACTITIONER

## 2024-03-17 PROCEDURE — 85027 COMPLETE CBC AUTOMATED: CPT

## 2024-03-17 PROCEDURE — 1210000001 HC SEMI-PRIVATE ROOM DAILY

## 2024-03-17 RX ORDER — POLYETHYLENE GLYCOL 3350 17 G/17G
17 POWDER, FOR SOLUTION ORAL 2 TIMES DAILY PRN
Qty: 14 PACKET | Refills: 0 | Status: SHIPPED | OUTPATIENT
Start: 2024-03-17 | End: 2024-04-18 | Stop reason: WASHOUT

## 2024-03-17 RX ORDER — FERROUS SULFATE 325(65) MG
325 TABLET ORAL
Qty: 30 TABLET | Refills: 0 | Status: SHIPPED | OUTPATIENT
Start: 2024-03-17 | End: 2024-04-18 | Stop reason: WASHOUT

## 2024-03-17 RX ORDER — LEVONORGESTREL AND ETHINYL ESTRADIOL 0.15-0.03
1 KIT ORAL DAILY
Qty: 84 TABLET | Refills: 3 | Status: SHIPPED | OUTPATIENT
Start: 2024-03-17 | End: 2024-03-18 | Stop reason: HOSPADM

## 2024-03-17 RX ORDER — IBUPROFEN 600 MG/1
600 TABLET ORAL EVERY 6 HOURS PRN
Qty: 90 TABLET | Refills: 0 | Status: SHIPPED | OUTPATIENT
Start: 2024-03-17 | End: 2024-04-02 | Stop reason: ALTCHOICE

## 2024-03-17 RX ORDER — ACETAMINOPHEN 325 MG/1
975 TABLET ORAL EVERY 6 HOURS PRN
Qty: 120 TABLET | Refills: 0 | Status: SHIPPED | OUTPATIENT
Start: 2024-03-17 | End: 2024-04-02 | Stop reason: ALTCHOICE

## 2024-03-17 RX ADMIN — ACETAMINOPHEN 975 MG: 325 TABLET ORAL at 15:27

## 2024-03-17 RX ADMIN — IBUPROFEN 600 MG: 600 TABLET, FILM COATED ORAL at 03:05

## 2024-03-17 RX ADMIN — ACETAMINOPHEN 975 MG: 325 TABLET ORAL at 03:05

## 2024-03-17 RX ADMIN — ENOXAPARIN SODIUM 40 MG: 100 INJECTION SUBCUTANEOUS at 00:25

## 2024-03-17 RX ADMIN — IBUPROFEN 600 MG: 600 TABLET, FILM COATED ORAL at 15:27

## 2024-03-17 RX ADMIN — IBUPROFEN 600 MG: 600 TABLET, FILM COATED ORAL at 09:08

## 2024-03-17 RX ADMIN — SIMETHICONE 80 MG: 80 TABLET, CHEWABLE ORAL at 09:20

## 2024-03-17 RX ADMIN — IBUPROFEN 600 MG: 600 TABLET, FILM COATED ORAL at 21:34

## 2024-03-17 RX ADMIN — ACETAMINOPHEN 975 MG: 325 TABLET ORAL at 21:34

## 2024-03-17 RX ADMIN — ACETAMINOPHEN 975 MG: 325 TABLET ORAL at 09:08

## 2024-03-17 RX ADMIN — OXYCODONE HYDROCHLORIDE 5 MG: 5 TABLET ORAL at 00:25

## 2024-03-17 ASSESSMENT — PAIN DESCRIPTION - LOCATION
LOCATION: ABDOMEN

## 2024-03-17 ASSESSMENT — PAIN - FUNCTIONAL ASSESSMENT: PAIN_FUNCTIONAL_ASSESSMENT: 0-10

## 2024-03-17 ASSESSMENT — PAIN DESCRIPTION - DESCRIPTORS
DESCRIPTORS: ACHING
DESCRIPTORS: ACHING;BURNING

## 2024-03-17 ASSESSMENT — PAIN SCALES - GENERAL
PAINLEVEL_OUTOF10: 5 - MODERATE PAIN
PAINLEVEL_OUTOF10: 5 - MODERATE PAIN
PAINLEVEL_OUTOF10: 6
PAINLEVEL_OUTOF10: 5 - MODERATE PAIN
PAINLEVEL_OUTOF10: 4
PAINLEVEL_OUTOF10: 5 - MODERATE PAIN

## 2024-03-17 NOTE — PROGRESS NOTES
Postpartum Progress Note    Assessment/Plan   Dot Casey is a 23 y.o., , who delivered at 36w0d gestation and is now postpartum day 2.    s/p LTCS on 3/15,   - continue routine postoperative care  - pain well controlled, transition to PO meds   - Hgb   Results from last 7 days   Lab Units 24  0750 24  0610 24  0920   HEMOGLOBIN g/dL 8.5* 8.6* 11.7*     - DVT Score: 6, for ppx lovenox    Acute blood loss anemia  - hgb 8.6 POD1--> 8.5 POD2  - asymptomatic, VSS; for PO Fe on DC  - continue to monitor for s/sx anemia      Elevated BP without diagnosis of HDP  - Mild range BP @ 13 wga  - Normotensive  - Baseline HELLP labs negative    Maternal Well-Being  - emotional support provided  - bonding with infant     Feeding  - Pt is strictly formula feeding and was counseled on non-pharmacologic methods of suppressing lactation including avoiding nipple/breast stimulation, avoiding heat application, applying ice, and wearing a supportive bra. Pt also counseled on s/sx of engorgement and mastitis.     Contraception  - JORGE at 6 weeks PP    Dispo  - anticipate d/c on POD #3 if meeting all post-op and postpartum milestones  - for f/u 2wks with Primary OB provider      Active Problems:    Elevated blood pressure affecting pregnancy, antepartum     delivery delivered    Gastroesophageal reflux disease    Anemia    ABLA (acute blood loss anemia)    Pregnancy Problems (from 23 to present)       Problem Noted Resolved    Uterine scar from previous surgery affecting pregnancy 2023 by Rowena Cade MD No    Priority:  High      Overview Signed 2023 10:31 AM by Rowena Cade MD     RIGHT CORNUAL WEDGE RESECTION 2023 FOR ECTOPIC  - SCHEDULE FOR  BY 37 WGA         Labor and delivery indication for care or intervention 3/15/2024 by Cat Sheldon MD No    Priority:  Medium      Loss of infant 2023 by Tali Colon MD No    Priority:  Medium       Overview Addendum 12/4/2023  9:37 AM by Tali Colon MD     Infant killed by acquaintance of prior FOB          Elevated blood pressure affecting pregnancy, antepartum 12/4/2023 by Tali Colon MD No    Priority:  Medium      Overview Addendum 1/19/2024 12:01 PM by Braydon Sanders MD     X1 MR /90 at 13w  P:C 0.09 12/4  CMP with second trimester labs          34 weeks gestation of pregnancy 10/6/2023 by Quintin Chopra No    Priority:  Medium      Overview Addendum 3/3/2024  7:27 PM by Carmina Garcia MD     Desired provider for birth: [x] Physician  [x] Dated by: 7w US  [x] Initial BMI: 22  [x] Prenatal Labs: WNL  [x] Rh status: O+  [x] Genetic Screening:  not ordered  [x] Baby ASA: not prescribed    [x] Anatomy US: WNL 11/17  [x] Fetal Sex: female  [x] 1hr GCT at 24-28wks: 2/12 wnl  [x] 3 hr GTT (if indicated): n/a    [x] Tdap (27-36wks): 1/19/24  [x] Flu Shot: declined 12/4, and 2/12    [x] Breastfeeding: yes  [x] Postpartum Birth control method: OCPs at 6 weeks   [] Labor Support:   [x] GBS at 36 wks: neg 2/29 (33.6wga) in s/o of early delivery planning for hx of cornual wedge resection  [x] del plan - CS @36-37wga; h/o ectopic x2 - s/p cornual ectopic wedge resection 5/2023 - scheduled for 3/15  **FOB wants to do skin to skin in OR and cut cord**         Status post ectopic pregnancy 10/6/2023 by Quintin Chopra No    Priority:  Medium      Overview Addendum 3/14/2024  1:48 PM by Cat Sheldon MD     X2, one cornual with wedge resection and one in tubal remnant on right     - 2018 RSO for 14cm ovarian cyst involving entire ovary  - 2016 ovarian cyst, s/p lap cystectomy 2 months PP, path c/w mature teratoma  - 12/2022 ruptured ectopic in remnant of right tube   - 5/2023 wedge resection of R cornual ectopic          History of surgical procedure 12/4/2023 by Tali Colon MD 3/14/2024 by Cat Sheldon MD    Overview Signed 12/4/2023  9:36 AM by Tali Colon MD     - 2018 RSO for 14cm ovarian  cyst involving entire ovary  -  ovarian cyst, s/p lap cystectomy 2 months PP, path c/w mature teratoma  - 2022 ruptured ectopic in remnant of right tube   - 2023 wedge resection of R cornual ectopic                Hospital course: no complications   section delivery  Patient is not breastfeedingThe patient's blood type is O POS. The baby's blood type is O POS . Rhogam is not indicated.    Subjective   Her pain is well controlled with current medications  She is passing flatus  She is ambulating well  She is tolerating a Adult diet Regular  She reports no breast or nursing problems  She denies emotional concerns today   Her plan for contraception is unknown     Denies HA, SOB, RUQ pain, vision changes.     Objective   Allergies:   Patient has no known allergies.         Last Vitals:  Temp Pulse Resp BP MAP Pulse Ox   36.5 °C (97.7 °F) 83 18 109/70   100 % (%)     Vitals Min/Max Last 24 Hours:  Temp  Min: 36.4 °C (97.5 °F)  Max: 36.7 °C (98.1 °F)  Pulse  Min: 78  Max: 96  Resp  Min: 17  Max: 18  BP  Min: 101/65  Max: 122/72    Intake/Output:   No intake or output data in the 24 hours ending 24 180      Physical Exam:  Incision: healing well, no drainage, no erythema, no swelling, well approximated.  General: Examination reveals a well developed, well nourished, female, in no acute distress. She is alert and cooperative.  Lungs: symmetrical, non-labored breathing.  Cardiac: warm, well-perfused.  Abdomen: soft, non-tender.  Fundus: firm, below umbilicus, and nontender.  Extremities: no redness or tenderness in the calves or thighs.  Neurological: alert, oriented, normal speech, no focal findings or movement disorder noted.     Lab Data:  Labs in chart were reviewed.

## 2024-03-17 NOTE — CARE PLAN
The patient's goals for the shift include pain 4/10 nor less    The clinical goals for the shift include afebrile-stable vital signs    Over the shift, the patient did  make progress toward the following goals. Barriers to progression include none. Recommendations to address these barriers include ambulation and simethicone to  help expel gas and routine meds for pain.

## 2024-03-18 ENCOUNTER — PHARMACY VISIT (OUTPATIENT)
Dept: PHARMACY | Facility: CLINIC | Age: 24
End: 2024-03-18
Payer: COMMERCIAL

## 2024-03-18 ENCOUNTER — PHARMACY VISIT (OUTPATIENT)
Dept: PHARMACY | Facility: CLINIC | Age: 24
End: 2024-03-18
Payer: MEDICAID

## 2024-03-18 VITALS
HEIGHT: 60 IN | DIASTOLIC BLOOD PRESSURE: 80 MMHG | BODY MASS INDEX: 33.72 KG/M2 | OXYGEN SATURATION: 98 % | RESPIRATION RATE: 18 BRPM | HEART RATE: 82 BPM | WEIGHT: 171.74 LBS | SYSTOLIC BLOOD PRESSURE: 123 MMHG | TEMPERATURE: 98.1 F

## 2024-03-18 PROCEDURE — RXMED WILLOW AMBULATORY MEDICATION CHARGE

## 2024-03-18 PROCEDURE — 2500000001 HC RX 250 WO HCPCS SELF ADMINISTERED DRUGS (ALT 637 FOR MEDICARE OP): Performed by: NURSE PRACTITIONER

## 2024-03-18 RX ORDER — NALOXONE HYDROCHLORIDE 4 MG/.1ML
4 SPRAY NASAL AS NEEDED
Qty: 2 EACH | Refills: 1 | Status: SHIPPED | OUTPATIENT
Start: 2024-03-18 | End: 2024-04-18 | Stop reason: WASHOUT

## 2024-03-18 RX ORDER — OXYCODONE HYDROCHLORIDE 5 MG/1
5 TABLET ORAL EVERY 6 HOURS PRN
Qty: 5 TABLET | Refills: 0 | Status: SHIPPED | OUTPATIENT
Start: 2024-03-18 | End: 2024-03-23

## 2024-03-18 RX ADMIN — OXYCODONE HYDROCHLORIDE 5 MG: 5 TABLET ORAL at 00:03

## 2024-03-18 RX ADMIN — IBUPROFEN 600 MG: 600 TABLET, FILM COATED ORAL at 04:59

## 2024-03-18 RX ADMIN — ACETAMINOPHEN 975 MG: 325 TABLET ORAL at 09:41

## 2024-03-18 RX ADMIN — IBUPROFEN 600 MG: 600 TABLET, FILM COATED ORAL at 09:41

## 2024-03-18 RX ADMIN — ACETAMINOPHEN 975 MG: 325 TABLET ORAL at 04:59

## 2024-03-18 ASSESSMENT — PAIN SCALES - GENERAL
PAINLEVEL_OUTOF10: 3
PAINLEVEL_OUTOF10: 4
PAINLEVEL_OUTOF10: 5 - MODERATE PAIN
PAINLEVEL_OUTOF10: 6

## 2024-03-18 ASSESSMENT — PAIN DESCRIPTION - LOCATION
LOCATION: ABDOMEN
LOCATION: ABDOMEN

## 2024-03-18 ASSESSMENT — PAIN - FUNCTIONAL ASSESSMENT: PAIN_FUNCTIONAL_ASSESSMENT: 0-10

## 2024-03-18 NOTE — CARE PLAN
The patient's goals for the shift include get rest    The clinical goals for the shift include normal PP course    Patient remains stable this shift. Patient has had stable VS and assessments. Patient meeting all PP milestones. Patient is to be discharged.

## 2024-03-18 NOTE — DISCHARGE SUMMARY
Discharge Summary    Admission Date: 3/15/2024  Discharge Date: 24  Discharge Diagnosis: Labor and delivery, indication for care     Patient Active Problem List   Diagnosis    Goiter    34 weeks gestation of pregnancy    Status post ectopic pregnancy    Uterine scar from previous surgery affecting pregnancy    Loss of infant    Elevated blood pressure affecting pregnancy, antepartum     delivery delivered    Gastroesophageal reflux disease    Anemia    ABLA (acute blood loss anemia)       Hospital Course  Dot Casey is a 23 y.o.,     Initially presented for: [facility]    Admission Date: 3/15/2024    Delivery Date: 3/15/2024  10:14 AM     Delivery type: , Low Transverse      GA at delivery: 36w0d    Outcome: Living     Anesthesia during delivery: Combined spinal-epidural     Intrapartum complications: None     Feeding method: Breastfeeding Status: No    Contraception: Interval COCs    Rhogam: The patient's blood type is O POS. The baby's blood type is O POS . Rhogam is not indicated.     Now postpartum day: 3.    Hospital course n/f:  Hg 11.7 -> 8.6 -> 8.5; acute blood loss anemia, asymptomatic, PO iron supplement    PP course otherwise uneventful.  Meeting all postpartum milestones- ambulating independently, passing flatus, tolerating PO intake, lochia light, voiding spontaneously, and pain well controlled with PO meds.     Dispo  OK for DC today  2 week incision check and 6wk postpartum follow-up.     Pertinent Physical Exam At Time of Discharge  General: well appearing, well nourished, postpartum  Obstetric: fundus firm below umbilicus, lochia light  Skin: Warm, dry; no rashes/lesions/erythema; Pfannenstiel incision: clean, dry, well approximated, open to air, negative discharge/warmth/erythema   Breast: No masses, nipple discharge  Neuro: A/Ox3, conversational, no gross motor deficit   GI: no distension, appropriately tender, soft, +BS  Respiratory: Even and unlabored on RA, LSCTA  BL  Cardiovascular: Trace BLE edema; No erythema, warmth  Psych: appropriate mood and affect       Your medication list        START taking these medications        Instructions Last Dose Given Next Dose Due   acetaminophen 325 mg tablet  Commonly known as: Tylenol      Take 3 tablets (975 mg) by mouth every 6 hours if needed for moderate pain (4 - 6).       FeroSuL tablet  Generic drug: ferrous sulfate (325 mg ferrous sulfate)      Take 1 tablet by mouth once daily in the morning. Take before meals.       ibuprofen 600 mg tablet      Take 1 tablet (600 mg) by mouth every 6 hours if needed for moderate pain (4 - 6).       Narcan 4 mg/0.1 mL nasal spray  Generic drug: naloxone      Administer 1 spray (4 mg) into affected nostril(s) if needed for opioid reversal or respiratory depression. May repeat every 2-3 minutes if needed, alternating nostrils, until medical assistance becomes available.       oxyCODONE 5 mg immediate release tablet  Commonly known as: Roxicodone      Take 1 tablet (5 mg) by mouth every 6 hours if needed for severe pain (7 - 10) for up to 5 days.       polyethylene glycol 17 gram packet  Commonly known as: Glycolax, Miralax      Take 17 g (mix 1 capful) and drink by mouth 2 times a day as needed (constipation).              CONTINUE taking these medications        Instructions Last Dose Given Next Dose Due   prenatal vitamin (iron-folic) 27 mg iron-800 mcg folic acid tablet      Take 1 tablet by mouth once daily.              STOP taking these medications      Prenatal 27 mg iron-800 mcg folic acid tablet  Generic drug: prenatal vitamin (iron-folic)                  Where to Get Your Medications        These medications were sent to [facility]  [address], [address], [place] [address]      Hours: 8AM to 6PM Mon-Fri, 8AM to 4PM Sat, 9AM to 1PM Sun Phone: [phone]   acetaminophen 325 mg tablet  FeroSuL tablet  ibuprofen 600 mg tablet  oxyCODONE 5 mg immediate release  tablet  polyethylene glycol 17 gram packet       These medications were sent to [facility]  [address] [facility] [address] [facility] [address]      Hours: 8:30 AM to 5 PM Mon-Fri Phone: [phone]   Narcan 4 mg/0.1 mL nasal spray           Outpatient Follow-Up  Future Appointments   Date Time Provider Department Center   3/29/2024  9:15 AM [id number] NURSE RESOURCE [id number] Academic       ETHAN Payne-CNP
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
## 2024-03-18 NOTE — LACTATION NOTE
This note was copied from a baby's chart.  Lactation Consultant Note  Lactation Consultation   Guera Pickett RN IBCLC    Recommendations/Summary       I spoke with mom at pt's bedside.  Mom reports that she is planning on formula feeding this baby.

## 2024-03-20 LAB
LABORATORY COMMENT REPORT: NORMAL
PATH REPORT.FINAL DX SPEC: NORMAL
PATH REPORT.GROSS SPEC: NORMAL
PATH REPORT.RELEVANT HX SPEC: NORMAL
PATH REPORT.TOTAL CANCER: NORMAL

## 2024-03-29 ENCOUNTER — APPOINTMENT (OUTPATIENT)
Dept: OBSTETRICS AND GYNECOLOGY | Facility: CLINIC | Age: 24
End: 2024-03-29
Payer: MEDICAID

## 2024-04-02 ENCOUNTER — CLINICAL SUPPORT (OUTPATIENT)
Dept: OBSTETRICS AND GYNECOLOGY | Facility: CLINIC | Age: 24
End: 2024-04-02
Payer: MEDICAID

## 2024-04-02 VITALS
DIASTOLIC BLOOD PRESSURE: 88 MMHG | SYSTOLIC BLOOD PRESSURE: 134 MMHG | HEART RATE: 66 BPM | BODY MASS INDEX: 29.29 KG/M2 | WEIGHT: 150 LBS

## 2024-04-02 DIAGNOSIS — Z48.89 ENCOUNTER FOR POSTOPERATIVE WOUND CHECK: ICD-10-CM

## 2024-04-02 DIAGNOSIS — Z01.30 BLOOD PRESSURE CHECK: ICD-10-CM

## 2024-04-02 NOTE — PROGRESS NOTES
Pt s/p c/s 3/15/24 Hx of corneal ectopic pregnancy, elevated B/P affecting pregnancy. B/P 134/88, no HA's, vision changes ,RUQ pain.  Not on hypertension medications. Incision well approximated no redness, drainage or odor. Pt feeling well. Denies pain No feelings of being down or depressed Has help at home. Reviewed with Dr Sanders Pt to follow up for 4-6 wk apt with provider. Precautions reviewed, when to call office or present to L&D.

## 2024-04-18 ENCOUNTER — POSTPARTUM VISIT (OUTPATIENT)
Dept: OBSTETRICS AND GYNECOLOGY | Facility: CLINIC | Age: 24
End: 2024-04-18
Payer: MEDICAID

## 2024-04-18 VITALS
WEIGHT: 152 LBS | DIASTOLIC BLOOD PRESSURE: 90 MMHG | BODY MASS INDEX: 29.69 KG/M2 | HEART RATE: 76 BPM | SYSTOLIC BLOOD PRESSURE: 135 MMHG

## 2024-04-18 DIAGNOSIS — Z30.011 ENCOUNTER FOR INITIAL PRESCRIPTION OF CONTRACEPTIVE PILLS: ICD-10-CM

## 2024-04-18 PROCEDURE — 0503F POSTPARTUM CARE VISIT: CPT

## 2024-04-18 RX ORDER — NORGESTIMATE AND ETHINYL ESTRADIOL 0.25-0.035
1 KIT ORAL DAILY
Qty: 84 TABLET | Refills: 3 | Status: SHIPPED | OUTPATIENT
Start: 2024-04-18 | End: 2025-04-18

## 2024-04-18 RX ORDER — SERTRALINE HYDROCHLORIDE 25 MG/1
25 TABLET, FILM COATED ORAL DAILY
Qty: 30 TABLET | Refills: 0 | Status: SHIPPED | OUTPATIENT
Start: 2024-04-18 | End: 2024-05-18

## 2024-04-18 ASSESSMENT — EDINBURGH POSTNATAL DEPRESSION SCALE (EPDS)
THE THOUGHT OF HARMING MYSELF HAS OCCURRED TO ME: NEVER
THINGS HAVE BEEN GETTING ON TOP OF ME: NO, MOST OF THE TIME I HAVE COPED QUITE WELL
I HAVE LOOKED FORWARD WITH ENJOYMENT TO THINGS: AS MUCH AS I EVER DID
I HAVE BEEN ABLE TO LAUGH AND SEE THE FUNNY SIDE OF THINGS: AS MUCH AS I ALWAYS COULD
I HAVE BEEN ANXIOUS OR WORRIED FOR NO GOOD REASON: NO, NOT AT ALL
I HAVE FELT SCARED OR PANICKY FOR NO GOOD REASON: NO, NOT AT ALL
I HAVE BEEN SO UNHAPPY THAT I HAVE HAD DIFFICULTY SLEEPING: NOT AT ALL
TOTAL SCORE: 1
I HAVE BEEN SO UNHAPPY THAT I HAVE BEEN CRYING: NO, NEVER
I HAVE BLAMED MYSELF UNNECESSARILY WHEN THINGS WENT WRONG: NO, NEVER
I HAVE FELT SAD OR MISERABLE: NO, NOT AT ALL

## 2024-04-18 ASSESSMENT — PAIN SCALES - GENERAL: PAINLEVEL: 0-NO PAIN
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
## 2024-04-19 NOTE — OP NOTE
Post Operative Note:     PreOp Diagnosis: concern for ruptured ectopic pregnancy   Post-Procedure Diagnosis: ruptured ectopic pregnancy   Procedure: 1. diagnostic laparoscopy  2. evacuation of hemoperitoneum  3. suture ligation of R tubal remnant  4. suction dilation and curettage   Surgeon: Dr. MAXIUMS Perez   Resident/Fellow/Other Assistant: Dr. MARLENA Cardoza   Anesthesia: GETA   I.V. Fluids: per anesthesia   Estimated Blood Loss (mL): 1L hemoperitoneum, 10cc  surgical blood loss   Blood Replacement: 1u PRBC in ED   Specimen: yes. intraperitoneal tissue, intrauterine  tissue   Complications: none   Findings: Massive hemoperitoneum, normal appearing  L fallopian tube & ovary, normal appearing anterior and posterior cul-de-sac. Bleeding R fallopian tube remnant.   Patient Returned To/Condition: PACU/good   Additional Details: doxycycline given intraop, methotrexate  to be administered in PACU     Operative Report Dictated:  Dictation: not applicable - note contains Operative  Report   Operative Report:    After consent was obtained, the patient was taken to the operating room, where she was placed on the operating room table in the supine position, antibiotics we administered,  and general anesthesia was established without difficulty. She was then placed in the dorsal lithotomy position with her legs in Yellofin stirrups, and her abdomen and vagina were then prepped and draped in the usual sterile fashion. Prior to the start  of the procedure, an orogastric tube was placed for gastric decompression. A Clifton catheter was then inserted into the bladder and noted to be draining clear urine at the start of the procedure. A speculum was placed in the patient's vagina, and a Hulka  manipulator was placed without difficulty.     Then attention was turned to the abdomen, where a 10 mm vertical skin incision was made at the base of the umbilicus, and the abdomen was entered without difficulty with open Pearce technique. A 10 mm  trocar with balloon was then inserted. Once intraabdominal  location was confirmed using the 10 mm laparoscope, pneumoperitoneum was achieved. Hemoperitoneum was noted. We placed additional trocars under direct visualization, one in the right lower quadrant, one in the left lower quadrant. Both 5 mm port sites.  Suction was performed of 1L blood and clot. We next performed an abdominal survey, where there was noted to be no injury to viscera or vessels at the time of port placement and abdominal entry. Then the patient was placed in steep Trendelenburg.      Attention was turned to the pelvis. The L fallopian tube and ovary were noted to be normal-appearing, intact, and without hemorrhage. The R fallopian tube and ovary were noted to be surgically absent. The right fallopian tube remnant was ruptured and  actively bleeding. The remnant was grasped with a laparoscopic Allis, and an Endoloop of PDS was secured over the entirety of the remnant. Two additional Endoloop sutures were placed for complete hemostasis and ligation. The R remnant was cauterized with  the LigaSure. A 5mm laparoscope was introduced and a Endocatch bag was introduced. Tissue appearing to be originating from the ruptured ectopic was carefully  from the omentum and collected via the bag. The 10mm scope was re-introduced. At this  time, suction dilation and curettage was performed given concern for incomplete expulsion of conception products through the ruptured tubal remnant. D&C was performed under direct abdominal visualization. Uterine cry was noted after 2 passes of the suction  curette. The uterine cornua remained hemostatic, abdominally. Final abdominal survey was performed with hemostasis again noted.    All instruments were removed from the abdomen and the vagina.    The fascia of the umbilical site was closed with 0-Vicryl in a figure-of-eight, and the skin closed with 4-0 Monocryl. Port sites were closed with 4-0 Monocryl with  Dermabond on top.     The patient tolerated the procedure well, and there were no complications. Dr. Perez was present and participated in the entire procedure, and the surgical counts were noted to be correct x2. Anesthesia was reversed and the patient was taken back to the  PACU in stable condition.    Cora Cardoza MD MPH (PGY-3)  Obstetrics and Gynecology     Attestation:   Note Completion:  I am a: Resident/Fellow   Attending Attestation I was present for the entire procedure    Comments/ Additional Findings    Performed surgery as documented by Dr. Cardoza. Normal appearing L fallopian tube and ovary. Active bleeding noted from right tubal remnant and suspected  products of conception within clot near the omentum. Endoloops x3 used to ligate remnant and hemostasis achieved. Given concern for incomplete removal of pregnancy, suction D&C performed under direct laparoscopic visualization and a small amount of tissue  obtained. Intra-operative doxycycline given after decision to proceed with suction D&C was made. Plan for methotrexate in PACU with close outpatient follow-up.          Electronic Signatures:  Catalina Perez (MD (Fellow))  (Signed 29-Dec-2022 10:08)   Authored: Post Operative Note, Note Completion   Co-Signer: Post Operative Note, Note Completion  Cora Cardoza (MD (Resident))  (Signed 29-Dec-2022 08:28)   Authored: Post Operative Note, Note Completion      Last Updated: 29-Dec-2022 10:08 by Catalina Perez (MD (Fellow))

## 2024-08-12 ENCOUNTER — HOSPITAL ENCOUNTER (EMERGENCY)
Facility: HOSPITAL | Age: 24
Discharge: HOME | End: 2024-08-12
Attending: EMERGENCY MEDICINE
Payer: MEDICAID

## 2024-08-12 VITALS
OXYGEN SATURATION: 100 % | RESPIRATION RATE: 18 BRPM | HEIGHT: 64 IN | SYSTOLIC BLOOD PRESSURE: 147 MMHG | DIASTOLIC BLOOD PRESSURE: 91 MMHG | TEMPERATURE: 98.5 F | HEART RATE: 61 BPM | WEIGHT: 145 LBS | BODY MASS INDEX: 24.75 KG/M2

## 2024-08-12 DIAGNOSIS — N94.89 LABIAL SWELLING: Primary | ICD-10-CM

## 2024-08-12 DIAGNOSIS — N30.90 CYSTITIS: ICD-10-CM

## 2024-08-12 LAB
APPEARANCE UR: ABNORMAL
BACTERIA #/AREA URNS AUTO: ABNORMAL /HPF
BILIRUB UR STRIP.AUTO-MCNC: NEGATIVE MG/DL
COLOR UR: YELLOW
GLUCOSE UR STRIP.AUTO-MCNC: NORMAL MG/DL
KETONES UR STRIP.AUTO-MCNC: ABNORMAL MG/DL
LEUKOCYTE ESTERASE UR QL STRIP.AUTO: ABNORMAL
MUCOUS THREADS #/AREA URNS AUTO: ABNORMAL /LPF
NITRITE UR QL STRIP.AUTO: NEGATIVE
PH UR STRIP.AUTO: 6.5 [PH]
PREGNANCY TEST URINE, POC: NEGATIVE
PROT UR STRIP.AUTO-MCNC: ABNORMAL MG/DL
RBC # UR STRIP.AUTO: ABNORMAL /UL
RBC #/AREA URNS AUTO: ABNORMAL /HPF
SP GR UR STRIP.AUTO: 1.03
SQUAMOUS #/AREA URNS AUTO: ABNORMAL /HPF
UROBILINOGEN UR STRIP.AUTO-MCNC: ABNORMAL MG/DL
WBC #/AREA URNS AUTO: >50 /HPF
WBC CLUMPS #/AREA URNS AUTO: ABNORMAL /HPF

## 2024-08-12 PROCEDURE — 99253 IP/OBS CNSLTJ NEW/EST LOW 45: CPT | Performed by: STUDENT IN AN ORGANIZED HEALTH CARE EDUCATION/TRAINING PROGRAM

## 2024-08-12 PROCEDURE — 81025 URINE PREGNANCY TEST: CPT | Performed by: EMERGENCY MEDICINE

## 2024-08-12 PROCEDURE — 36415 COLL VENOUS BLD VENIPUNCTURE: CPT

## 2024-08-12 PROCEDURE — 87491 CHLMYD TRACH DNA AMP PROBE: CPT

## 2024-08-12 PROCEDURE — 2500000004 HC RX 250 GENERAL PHARMACY W/ HCPCS (ALT 636 FOR OP/ED): Mod: SE

## 2024-08-12 PROCEDURE — 96372 THER/PROPH/DIAG INJ SC/IM: CPT

## 2024-08-12 PROCEDURE — 81003 URINALYSIS AUTO W/O SCOPE: CPT | Performed by: EMERGENCY MEDICINE

## 2024-08-12 PROCEDURE — 87086 URINE CULTURE/COLONY COUNT: CPT | Performed by: EMERGENCY MEDICINE

## 2024-08-12 PROCEDURE — 99284 EMERGENCY DEPT VISIT MOD MDM: CPT

## 2024-08-12 PROCEDURE — 87661 TRICHOMONAS VAGINALIS AMPLIF: CPT

## 2024-08-12 RX ORDER — PHENAZOPYRIDINE HYDROCHLORIDE 95 MG/1
95 TABLET ORAL 3 TIMES DAILY PRN
Qty: 10 TABLET | Refills: 0 | Status: SHIPPED | OUTPATIENT
Start: 2024-08-12 | End: 2024-08-15

## 2024-08-12 RX ORDER — NITROFURANTOIN 25; 75 MG/1; MG/1
100 CAPSULE ORAL 2 TIMES DAILY
Qty: 10 CAPSULE | Refills: 0 | Status: SHIPPED | OUTPATIENT
Start: 2024-08-12 | End: 2024-08-17

## 2024-08-12 RX ORDER — MORPHINE SULFATE 4 MG/ML
4 INJECTION INTRAVENOUS ONCE
Status: COMPLETED | OUTPATIENT
Start: 2024-08-12 | End: 2024-08-12

## 2024-08-12 RX ORDER — NITROFURANTOIN 25; 75 MG/1; MG/1
100 CAPSULE ORAL 2 TIMES DAILY
Qty: 10 CAPSULE | Refills: 0 | Status: SHIPPED | OUTPATIENT
Start: 2024-08-12 | End: 2024-08-12

## 2024-08-12 ASSESSMENT — PAIN - FUNCTIONAL ASSESSMENT: PAIN_FUNCTIONAL_ASSESSMENT: 0-10

## 2024-08-12 ASSESSMENT — PAIN SCALES - GENERAL: PAINLEVEL_OUTOF10: 7

## 2024-08-12 ASSESSMENT — COLUMBIA-SUICIDE SEVERITY RATING SCALE - C-SSRS
6. HAVE YOU EVER DONE ANYTHING, STARTED TO DO ANYTHING, OR PREPARED TO DO ANYTHING TO END YOUR LIFE?: NO
1. IN THE PAST MONTH, HAVE YOU WISHED YOU WERE DEAD OR WISHED YOU COULD GO TO SLEEP AND NOT WAKE UP?: NO
2. HAVE YOU ACTUALLY HAD ANY THOUGHTS OF KILLING YOURSELF?: NO

## 2024-08-12 NOTE — DISCHARGE INSTRUCTIONS
Lidocaine gel applied as needed for pain, ibuprofen or Tylenol, ice packs.  OB/GYN will reach out and make an appointment for follow-up.  If you notice worsening pain or swelling, come back to the ER.  We did send an STD test.  They will come back in 1 to 2 days.

## 2024-08-12 NOTE — Clinical Note
Dot Casey was seen and treated in our emergency department on 8/12/2024.  She may return to work on 08/15/2024.       If you have any questions or concerns, please don't hesitate to call.      Whit Harmon PA-C
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
## 2024-08-13 LAB
BACTERIA UR CULT: NORMAL
C TRACH RRNA SPEC QL NAA+PROBE: NEGATIVE
HOLD SPECIMEN: NORMAL
N GONORRHOEA DNA SPEC QL PROBE+SIG AMP: NEGATIVE
T VAGINALIS RRNA SPEC QL NAA+PROBE: NEGATIVE

## 2024-08-14 ENCOUNTER — DOCUMENTATION (OUTPATIENT)
Dept: EMERGENCY MEDICINE | Facility: HOSPITAL | Age: 24
End: 2024-08-14
Payer: MEDICAID

## 2024-08-14 NOTE — RESEARCH NOTES
5:32 PM    Test:   Discussed HIV/HCV testing with the patient in the ED.   Patient received [HIV and HCV] test today    Test Result:  HIV [Negative]  HCV [Negative]    Result notification:  Patient was notified of their test results on [08-] via phone after verifying 3 patient identifiers]    Follow-up plan:   Patient was referred to [No Referral] on [Date]  Patient has appointment at [No Appointment] on [Date]  Barriers to attending appointment: [None]  Missed appointments: [None]    Signed  Leon Shelby Jr.  HIV/HCV FOCUS Program  Department of Emergency Medicine - Research  Please contact me via email or Epic Chat with questions

## 2024-08-15 ENCOUNTER — OFFICE VISIT (OUTPATIENT)
Dept: OBSTETRICS AND GYNECOLOGY | Facility: CLINIC | Age: 24
End: 2024-08-15
Payer: MEDICAID

## 2024-08-15 VITALS
HEIGHT: 60 IN | HEART RATE: 81 BPM | WEIGHT: 136.1 LBS | DIASTOLIC BLOOD PRESSURE: 84 MMHG | BODY MASS INDEX: 26.72 KG/M2 | SYSTOLIC BLOOD PRESSURE: 129 MMHG

## 2024-08-15 DIAGNOSIS — Z30.011 ENCOUNTER FOR INITIAL PRESCRIPTION OF CONTRACEPTIVE PILLS: ICD-10-CM

## 2024-08-15 DIAGNOSIS — Z30.41 ENCOUNTER FOR SURVEILLANCE OF CONTRACEPTIVE PILLS: Primary | ICD-10-CM

## 2024-08-15 DIAGNOSIS — N94.89 LABIAL SWELLING: ICD-10-CM

## 2024-08-15 PROCEDURE — 87529 HSV DNA AMP PROBE: CPT | Performed by: STUDENT IN AN ORGANIZED HEALTH CARE EDUCATION/TRAINING PROGRAM

## 2024-08-15 RX ORDER — NORGESTIMATE AND ETHINYL ESTRADIOL 0.25-0.035
1 KIT ORAL DAILY
Qty: 84 TABLET | Refills: 3 | Status: SHIPPED | OUTPATIENT
Start: 2024-08-15 | End: 2025-08-15

## 2024-08-15 ASSESSMENT — PAIN SCALES - GENERAL: PAINLEVEL: 0-NO PAIN

## 2024-08-16 LAB
HSV1 DNA SKIN QL NAA+PROBE: NOT DETECTED
HSV2 DNA SKIN QL NAA+PROBE: NOT DETECTED

## 2024-08-18 PROBLEM — Z30.41 ENCOUNTER FOR SURVEILLANCE OF CONTRACEPTIVE PILLS: Status: ACTIVE | Noted: 2024-08-18

## 2024-08-18 PROBLEM — N94.89 LABIAL SWELLING: Status: ACTIVE | Noted: 2024-08-18

## 2024-08-18 PROBLEM — Z30.011 ENCOUNTER FOR INITIAL PRESCRIPTION OF CONTRACEPTIVE PILLS: Status: ACTIVE | Noted: 2024-08-18

## 2024-08-18 NOTE — ASSESSMENT & PLAN NOTE
- Likely labial injury after intercourse  - 8/12 exam without ulcerations or lesions  - 8/15 exam improved, but now with 1.5x1.5cm shallow ulcer on internal, medial, superior aspect of R labia minora -> HSV swab collected and sent  - GC/CT/trich neg, serum STI testing pending  - Continue supportive care

## 2024-08-18 NOTE — PROGRESS NOTES
"Dot Casey is a 23 y.o. female who is here for follow up of vulvar pain/likely labial laceration    Subjective   Presented to ED 8/12, at the time had intercourse 5 days ago, described as \"rough\", and immediately after had a large volume bleed from vaginal area. This resolved, but the next day she noticed severe pain and swelling of her vulvar area.    In ED, evaluation c/w likely traumatic injury of R labia minora with subsequent inflammation leading to edema and pain. At the time, no e/o ulcers or lesions on exam, low c/f HSV/syphilis/other causes of abn lesions. Low c/f infection based on exam. Rec'd supportive care with lido gel, ice packs, loose clothing, dilution with water when urinating. STI testing (GC/CT/trich neg, syphilis not yet done)    Currently sx improved but not fully resolved. Not using lido gel, but has been using water on vulva when urinating and this has helped a lot. Still not able to wear underwear comfortably. No issues with bladder or bowel movements. No f/c.    Objective   /84   Pulse 81   Ht 1.524 m (5')   Wt 61.7 kg (136 lb 1.6 oz)   LMP  (LMP Unknown)   Breastfeeding No   BMI 26.58 kg/m²     Physical Exam  Constitutional:       Appearance: Normal appearance.   HENT:      Head: Normocephalic and atraumatic.      Nose: Nose normal.      Mouth/Throat:      Mouth: Mucous membranes are moist.   Eyes:      Extraocular Movements: Extraocular movements intact.   Pulmonary:      Effort: Pulmonary effort is normal.   Abdominal:      General: Abdomen is flat.   Genitourinary:     Comments: Normal appearing mons and labia majora. Normal appearing L labia minora. R labia minora edematous but decreased from prior and only protruding out of labia majora approx 1/2cm. Minimally tender to touch, with very mild erythema. 1.5x1.5cm shallow ulcer on internal, medial, superior aspect of R labia minora. No notable location of injury. No areas of fluctuance, purulunce, exudate, or abnormal " discharge  Musculoskeletal:         General: Normal range of motion.   Skin:     General: Skin is warm and dry.   Neurological:      General: No focal deficit present.      Mental Status: She is alert and oriented to person, place, and time.   Psychiatric:         Mood and Affect: Mood normal.         Behavior: Behavior normal.       Assessment/Plan   Dot Casey is a 23 y.o. female who is here for  follow up of vulvar pain/likely labial laceration  Problem List Items Addressed This Visit       Encounter for surveillance of contraceptive pills - Primary    Current Assessment & Plan     Renew COCP rx         Relevant Medications    norgestimate-ethinyl estradioL (Ortho-Cyclen) 0.25-35 mg-mcg tablet    Labial swelling    Current Assessment & Plan     - Likely labial injury after intercourse  - 8/12 exam without ulcerations or lesions  - 8/15 exam improved, but now with 1.5x1.5cm shallow ulcer on internal, medial, superior aspect of R labia minora -> HSV swab collected and sent  - GC/CT/trich neg, serum STI testing pending  - Continue supportive care         Relevant Orders    Hepatitis C Antibody    HIV 1/2 Antigen/Antibody Screen with Reflex to Confirmation    Hepatitis B Surface Antigen    Syphilis Screen with Reflex    HSV PCR, Skin/Mucosa (Completed)      RTC 2w    D/w Dr. Keshav Mckenzie MD  PGY-4, Obstetrics and Gynecology

## 2024-08-26 NOTE — PROGRESS NOTES
I saw and evaluated the patient. I personally obtained the key and critical portions of the history and physical exam or was physically present for key and critical portions performed by the resident/fellow. I reviewed the resident/fellow's documentation and discussed the patient with the resident/fellow. I agree with the resident/fellow's medical decision making as documented in the note.    Marcia Parr MD

## 2025-03-24 ENCOUNTER — ANESTHESIA EVENT (OUTPATIENT)
Dept: OPERATING ROOM | Facility: HOSPITAL | Age: 25
End: 2025-03-24
Payer: MEDICAID

## 2025-03-24 ENCOUNTER — HOSPITAL ENCOUNTER (OUTPATIENT)
Facility: HOSPITAL | Age: 25
Discharge: HOME | End: 2025-03-24
Attending: STUDENT IN AN ORGANIZED HEALTH CARE EDUCATION/TRAINING PROGRAM
Payer: MEDICAID

## 2025-03-24 ENCOUNTER — ANESTHESIA (OUTPATIENT)
Dept: OPERATING ROOM | Facility: HOSPITAL | Age: 25
End: 2025-03-24
Payer: MEDICAID

## 2025-03-24 VITALS
OXYGEN SATURATION: 100 % | BODY MASS INDEX: 26.7 KG/M2 | WEIGHT: 136 LBS | TEMPERATURE: 97.2 F | DIASTOLIC BLOOD PRESSURE: 68 MMHG | HEART RATE: 64 BPM | HEIGHT: 60 IN | SYSTOLIC BLOOD PRESSURE: 121 MMHG | RESPIRATION RATE: 16 BRPM

## 2025-03-24 DIAGNOSIS — G89.18 POSTOPERATIVE PAIN: ICD-10-CM

## 2025-03-24 DIAGNOSIS — O00.90 RUPTURED ECTOPIC PREGNANCY (HHS-HCC): Primary | ICD-10-CM

## 2025-03-24 DIAGNOSIS — R52 POSTPARTUM PAIN (HHS-HCC): ICD-10-CM

## 2025-03-24 DIAGNOSIS — Z30.018 ENCOUNTER FOR INITIAL PRESCRIPTION OF OTHER CONTRACEPTIVES: ICD-10-CM

## 2025-03-24 DIAGNOSIS — K59.00 CONSTIPATION, UNSPECIFIED CONSTIPATION TYPE: ICD-10-CM

## 2025-03-24 LAB
ABO GROUP (TYPE) IN BLOOD: NORMAL
ANION GAP SERPL CALC-SCNC: 12 MMOL/L (ref 10–20)
ANTIBODY SCREEN: NORMAL
APTT PPP: 19 SECONDS (ref 26–36)
APTT PPP: 24 SECONDS (ref 26–36)
B-HCG SERPL-ACNC: ABNORMAL MIU/ML
BASOPHILS # BLD AUTO: 0.06 X10*3/UL (ref 0–0.1)
BASOPHILS NFR BLD AUTO: 0.3 %
BLOOD EXPIRATION DATE: NORMAL
BLOOD EXPIRATION DATE: NORMAL
BUN SERPL-MCNC: 11 MG/DL (ref 6–23)
CALCIUM SERPL-MCNC: 8.1 MG/DL (ref 8.6–10.6)
CHLORIDE SERPL-SCNC: 109 MMOL/L (ref 98–107)
CO2 SERPL-SCNC: 19 MMOL/L (ref 21–32)
CREAT SERPL-MCNC: 0.67 MG/DL (ref 0.5–1.05)
DISPENSE STATUS: NORMAL
DISPENSE STATUS: NORMAL
EGFRCR SERPLBLD CKD-EPI 2021: >90 ML/MIN/1.73M*2
EOSINOPHIL # BLD AUTO: 0.01 X10*3/UL (ref 0–0.7)
EOSINOPHIL NFR BLD AUTO: 0 %
ERYTHROCYTE [DISTWIDTH] IN BLOOD BY AUTOMATED COUNT: 14.4 % (ref 11.5–14.5)
ERYTHROCYTE [DISTWIDTH] IN BLOOD BY AUTOMATED COUNT: 14.4 % (ref 11.5–14.5)
FIBRINOGEN PPP-MCNC: 182 MG/DL (ref 200–400)
GLUCOSE SERPL-MCNC: 104 MG/DL (ref 74–99)
HCT VFR BLD AUTO: 26.2 % (ref 36–46)
HCT VFR BLD AUTO: 28.1 % (ref 36–46)
HGB BLD-MCNC: 8.7 G/DL (ref 12–16)
HGB BLD-MCNC: 9.1 G/DL (ref 12–16)
IMM GRANULOCYTES # BLD AUTO: 0.14 X10*3/UL (ref 0–0.7)
IMM GRANULOCYTES NFR BLD AUTO: 0.6 % (ref 0–0.9)
INR PPP: 1.1 (ref 0.9–1.1)
INR PPP: 1.1 (ref 0.9–1.1)
LYMPHOCYTES # BLD AUTO: 0.65 X10*3/UL (ref 1.2–4.8)
LYMPHOCYTES NFR BLD AUTO: 2.9 %
MCH RBC QN AUTO: 28.8 PG (ref 26–34)
MCH RBC QN AUTO: 29.1 PG (ref 26–34)
MCHC RBC AUTO-ENTMCNC: 31 G/DL (ref 32–36)
MCHC RBC AUTO-ENTMCNC: 34.7 G/DL (ref 32–36)
MCV RBC AUTO: 84 FL (ref 80–100)
MCV RBC AUTO: 93 FL (ref 80–100)
MONOCYTES # BLD AUTO: 0.58 X10*3/UL (ref 0.1–1)
MONOCYTES NFR BLD AUTO: 2.6 %
NEUTROPHILS # BLD AUTO: 20.88 X10*3/UL (ref 1.2–7.7)
NEUTROPHILS NFR BLD AUTO: 93.6 %
NRBC BLD-RTO: 0 /100 WBCS (ref 0–0)
NRBC BLD-RTO: 0 /100 WBCS (ref 0–0)
PLATELET # BLD AUTO: 167 X10*3/UL (ref 150–450)
PLATELET # BLD AUTO: 281 X10*3/UL (ref 150–450)
POTASSIUM SERPL-SCNC: 4.6 MMOL/L (ref 3.5–5.3)
PRODUCT BLOOD TYPE: 5100
PRODUCT BLOOD TYPE: 5100
PRODUCT CODE: NORMAL
PRODUCT CODE: NORMAL
PROTHROMBIN TIME: 11.9 SECONDS (ref 9.8–12.4)
PROTHROMBIN TIME: 12.2 SECONDS (ref 9.8–12.4)
RBC # BLD AUTO: 3.02 X10*6/UL (ref 4–5.2)
RBC # BLD AUTO: 3.13 X10*6/UL (ref 4–5.2)
RH FACTOR (ANTIGEN D): NORMAL
SODIUM SERPL-SCNC: 135 MMOL/L (ref 136–145)
UNIT ABO: NORMAL
UNIT ABO: NORMAL
UNIT NUMBER: NORMAL
UNIT NUMBER: NORMAL
UNIT RH: NORMAL
UNIT RH: NORMAL
UNIT VOLUME: 269
UNIT VOLUME: 362
WBC # BLD AUTO: 19.8 X10*3/UL (ref 4.4–11.3)
WBC # BLD AUTO: 22.3 X10*3/UL (ref 4.4–11.3)

## 2025-03-24 PROCEDURE — 88305 TISSUE EXAM BY PATHOLOGIST: CPT | Mod: TC,SUR | Performed by: STUDENT IN AN ORGANIZED HEALTH CARE EDUCATION/TRAINING PROGRAM

## 2025-03-24 PROCEDURE — 80048 BASIC METABOLIC PNL TOTAL CA: CPT | Performed by: STUDENT IN AN ORGANIZED HEALTH CARE EDUCATION/TRAINING PROGRAM

## 2025-03-24 PROCEDURE — 99243 OFF/OP CNSLTJ NEW/EST LOW 30: CPT

## 2025-03-24 PROCEDURE — 86850 RBC ANTIBODY SCREEN: CPT | Performed by: STUDENT IN AN ORGANIZED HEALTH CARE EDUCATION/TRAINING PROGRAM

## 2025-03-24 PROCEDURE — 7100000009 HC PHASE TWO TIME - INITIAL BASE CHARGE: Performed by: STUDENT IN AN ORGANIZED HEALTH CARE EDUCATION/TRAINING PROGRAM

## 2025-03-24 PROCEDURE — 7100000002 HC RECOVERY ROOM TIME - EACH INCREMENTAL 1 MINUTE: Performed by: STUDENT IN AN ORGANIZED HEALTH CARE EDUCATION/TRAINING PROGRAM

## 2025-03-24 PROCEDURE — 36415 COLL VENOUS BLD VENIPUNCTURE: CPT | Performed by: STUDENT IN AN ORGANIZED HEALTH CARE EDUCATION/TRAINING PROGRAM

## 2025-03-24 PROCEDURE — 2500000005 HC RX 250 GENERAL PHARMACY W/O HCPCS: Mod: SE | Performed by: STUDENT IN AN ORGANIZED HEALTH CARE EDUCATION/TRAINING PROGRAM

## 2025-03-24 PROCEDURE — 99140 ANES COMP EMERGENCY COND: CPT | Performed by: ANESTHESIOLOGY

## 2025-03-24 PROCEDURE — 2500000005 HC RX 250 GENERAL PHARMACY W/O HCPCS: Mod: SE

## 2025-03-24 PROCEDURE — 85025 COMPLETE CBC W/AUTO DIFF WBC: CPT | Performed by: STUDENT IN AN ORGANIZED HEALTH CARE EDUCATION/TRAINING PROGRAM

## 2025-03-24 PROCEDURE — 2500000004 HC RX 250 GENERAL PHARMACY W/ HCPCS (ALT 636 FOR OP/ED): Mod: JZ,SE | Performed by: STUDENT IN AN ORGANIZED HEALTH CARE EDUCATION/TRAINING PROGRAM

## 2025-03-24 PROCEDURE — 96374 THER/PROPH/DIAG INJ IV PUSH: CPT | Mod: 59

## 2025-03-24 PROCEDURE — 2500000004 HC RX 250 GENERAL PHARMACY W/ HCPCS (ALT 636 FOR OP/ED): Mod: SE

## 2025-03-24 PROCEDURE — 3700000001 HC GENERAL ANESTHESIA TIME - INITIAL BASE CHARGE: Performed by: STUDENT IN AN ORGANIZED HEALTH CARE EDUCATION/TRAINING PROGRAM

## 2025-03-24 PROCEDURE — 36415 COLL VENOUS BLD VENIPUNCTURE: CPT

## 2025-03-24 PROCEDURE — 2780000003 HC OR 278 NO HCPCS: Performed by: STUDENT IN AN ORGANIZED HEALTH CARE EDUCATION/TRAINING PROGRAM

## 2025-03-24 PROCEDURE — 3600000003 HC OR TIME - INITIAL BASE CHARGE - PROCEDURE LEVEL THREE: Performed by: STUDENT IN AN ORGANIZED HEALTH CARE EDUCATION/TRAINING PROGRAM

## 2025-03-24 PROCEDURE — 3600000009 HC OR TIME - EACH INCREMENTAL 1 MINUTE - PROCEDURE LEVEL FOUR: Performed by: STUDENT IN AN ORGANIZED HEALTH CARE EDUCATION/TRAINING PROGRAM

## 2025-03-24 PROCEDURE — 2500000004 HC RX 250 GENERAL PHARMACY W/ HCPCS (ALT 636 FOR OP/ED): Mod: SE | Performed by: STUDENT IN AN ORGANIZED HEALTH CARE EDUCATION/TRAINING PROGRAM

## 2025-03-24 PROCEDURE — 85610 PROTHROMBIN TIME: CPT

## 2025-03-24 PROCEDURE — 96375 TX/PRO/DX INJ NEW DRUG ADDON: CPT | Mod: 59

## 2025-03-24 PROCEDURE — 96376 TX/PRO/DX INJ SAME DRUG ADON: CPT | Mod: 59

## 2025-03-24 PROCEDURE — 86923 COMPATIBILITY TEST ELECTRIC: CPT

## 2025-03-24 PROCEDURE — P9045 ALBUMIN (HUMAN), 5%, 250 ML: HCPCS | Mod: JZ,SE

## 2025-03-24 PROCEDURE — A59150 PR RX ECTOP PREG BY LAPAROSCOPE: Performed by: ANESTHESIOLOGY

## 2025-03-24 PROCEDURE — 85610 PROTHROMBIN TIME: CPT | Performed by: STUDENT IN AN ORGANIZED HEALTH CARE EDUCATION/TRAINING PROGRAM

## 2025-03-24 PROCEDURE — 7100000001 HC RECOVERY ROOM TIME - INITIAL BASE CHARGE: Performed by: STUDENT IN AN ORGANIZED HEALTH CARE EDUCATION/TRAINING PROGRAM

## 2025-03-24 PROCEDURE — 99291 CRITICAL CARE FIRST HOUR: CPT | Performed by: STUDENT IN AN ORGANIZED HEALTH CARE EDUCATION/TRAINING PROGRAM

## 2025-03-24 PROCEDURE — 85384 FIBRINOGEN ACTIVITY: CPT

## 2025-03-24 PROCEDURE — 3600000004 HC OR TIME - INITIAL BASE CHARGE - PROCEDURE LEVEL FOUR: Performed by: STUDENT IN AN ORGANIZED HEALTH CARE EDUCATION/TRAINING PROGRAM

## 2025-03-24 PROCEDURE — 2500000004 HC RX 250 GENERAL PHARMACY W/ HCPCS (ALT 636 FOR OP/ED): Mod: JZ,SE

## 2025-03-24 PROCEDURE — 7100000010 HC PHASE TWO TIME - EACH INCREMENTAL 1 MINUTE: Performed by: STUDENT IN AN ORGANIZED HEALTH CARE EDUCATION/TRAINING PROGRAM

## 2025-03-24 PROCEDURE — 2720000007 HC OR 272 NO HCPCS: Performed by: STUDENT IN AN ORGANIZED HEALTH CARE EDUCATION/TRAINING PROGRAM

## 2025-03-24 PROCEDURE — 3700000002 HC GENERAL ANESTHESIA TIME - EACH INCREMENTAL 1 MINUTE: Performed by: STUDENT IN AN ORGANIZED HEALTH CARE EDUCATION/TRAINING PROGRAM

## 2025-03-24 PROCEDURE — 84702 CHORIONIC GONADOTROPIN TEST: CPT | Performed by: STUDENT IN AN ORGANIZED HEALTH CARE EDUCATION/TRAINING PROGRAM

## 2025-03-24 PROCEDURE — 85027 COMPLETE CBC AUTOMATED: CPT | Mod: 59 | Performed by: STUDENT IN AN ORGANIZED HEALTH CARE EDUCATION/TRAINING PROGRAM

## 2025-03-24 PROCEDURE — 2500000001 HC RX 250 WO HCPCS SELF ADMINISTERED DRUGS (ALT 637 FOR MEDICARE OP): Mod: SE

## 2025-03-24 PROCEDURE — 3600000008 HC OR TIME - EACH INCREMENTAL 1 MINUTE - PROCEDURE LEVEL THREE: Performed by: STUDENT IN AN ORGANIZED HEALTH CARE EDUCATION/TRAINING PROGRAM

## 2025-03-24 RX ORDER — OXYCODONE HYDROCHLORIDE 5 MG/1
5 TABLET ORAL EVERY 6 HOURS PRN
Qty: 10 TABLET | Refills: 0 | Status: SHIPPED | OUTPATIENT
Start: 2025-03-24

## 2025-03-24 RX ORDER — PHENYLEPHRINE HCL IN 0.9% NACL 0.4MG/10ML
SYRINGE (ML) INTRAVENOUS AS NEEDED
Status: DISCONTINUED | OUTPATIENT
Start: 2025-03-24 | End: 2025-03-24

## 2025-03-24 RX ORDER — ALBUMIN HUMAN 50 G/1000ML
SOLUTION INTRAVENOUS AS NEEDED
Status: DISCONTINUED | OUTPATIENT
Start: 2025-03-24 | End: 2025-03-24

## 2025-03-24 RX ORDER — POLYETHYLENE GLYCOL 3350 17 G/17G
17 POWDER, FOR SOLUTION ORAL DAILY
Qty: 30 PACKET | Refills: 0 | Status: SHIPPED | OUTPATIENT
Start: 2025-03-24 | End: 2025-04-23

## 2025-03-24 RX ORDER — OXYCODONE HYDROCHLORIDE 5 MG/1
10 TABLET ORAL EVERY 4 HOURS PRN
Status: DISCONTINUED | OUTPATIENT
Start: 2025-03-24 | End: 2025-03-24 | Stop reason: HOSPADM

## 2025-03-24 RX ORDER — ROCURONIUM BROMIDE 10 MG/ML
INJECTION, SOLUTION INTRAVENOUS AS NEEDED
Status: DISCONTINUED | OUTPATIENT
Start: 2025-03-24 | End: 2025-03-24

## 2025-03-24 RX ORDER — FENTANYL CITRATE 50 UG/ML
INJECTION, SOLUTION INTRAMUSCULAR; INTRAVENOUS AS NEEDED
Status: DISCONTINUED | OUTPATIENT
Start: 2025-03-24 | End: 2025-03-24

## 2025-03-24 RX ORDER — ONDANSETRON HYDROCHLORIDE 2 MG/ML
4 INJECTION, SOLUTION INTRAVENOUS ONCE AS NEEDED
Status: COMPLETED | OUTPATIENT
Start: 2025-03-24 | End: 2025-03-24

## 2025-03-24 RX ORDER — ACETAMINOPHEN 500 MG
1000 TABLET ORAL EVERY 6 HOURS PRN
Qty: 80 TABLET | Refills: 0 | Status: SHIPPED | OUTPATIENT
Start: 2025-03-24

## 2025-03-24 RX ORDER — KETAMINE HYDROCHLORIDE 10 MG/ML
INJECTION, SOLUTION INTRAMUSCULAR; INTRAVENOUS AS NEEDED
Status: DISCONTINUED | OUTPATIENT
Start: 2025-03-24 | End: 2025-03-24

## 2025-03-24 RX ORDER — ONDANSETRON HYDROCHLORIDE 2 MG/ML
INJECTION, SOLUTION INTRAVENOUS AS NEEDED
Status: DISCONTINUED | OUTPATIENT
Start: 2025-03-24 | End: 2025-03-24

## 2025-03-24 RX ORDER — ONDANSETRON HYDROCHLORIDE 2 MG/ML
INJECTION, SOLUTION INTRAVENOUS
Status: COMPLETED
Start: 2025-03-24 | End: 2025-03-24

## 2025-03-24 RX ORDER — ALBUTEROL SULFATE 0.83 MG/ML
2.5 SOLUTION RESPIRATORY (INHALATION) ONCE AS NEEDED
Status: DISCONTINUED | OUTPATIENT
Start: 2025-03-24 | End: 2025-03-24 | Stop reason: HOSPADM

## 2025-03-24 RX ORDER — ONDANSETRON HYDROCHLORIDE 2 MG/ML
4 INJECTION, SOLUTION INTRAVENOUS ONCE
Status: COMPLETED | OUTPATIENT
Start: 2025-03-24 | End: 2025-03-24

## 2025-03-24 RX ORDER — HYDROMORPHONE HYDROCHLORIDE 0.2 MG/ML
0.2 INJECTION INTRAMUSCULAR; INTRAVENOUS; SUBCUTANEOUS EVERY 5 MIN PRN
Status: DISCONTINUED | OUTPATIENT
Start: 2025-03-24 | End: 2025-03-24 | Stop reason: HOSPADM

## 2025-03-24 RX ORDER — HYDROMORPHONE HYDROCHLORIDE 1 MG/ML
INJECTION, SOLUTION INTRAMUSCULAR; INTRAVENOUS; SUBCUTANEOUS AS NEEDED
Status: DISCONTINUED | OUTPATIENT
Start: 2025-03-24 | End: 2025-03-24

## 2025-03-24 RX ORDER — LIDOCAINE HYDROCHLORIDE 10 MG/ML
0.1 INJECTION, SOLUTION EPIDURAL; INFILTRATION; INTRACAUDAL; PERINEURAL ONCE
Status: DISCONTINUED | OUTPATIENT
Start: 2025-03-24 | End: 2025-03-24 | Stop reason: HOSPADM

## 2025-03-24 RX ORDER — OXYCODONE HYDROCHLORIDE 5 MG/1
5 TABLET ORAL EVERY 4 HOURS PRN
Status: DISCONTINUED | OUTPATIENT
Start: 2025-03-24 | End: 2025-03-24 | Stop reason: HOSPADM

## 2025-03-24 RX ORDER — MIDAZOLAM HYDROCHLORIDE 1 MG/ML
INJECTION INTRAMUSCULAR; INTRAVENOUS AS NEEDED
Status: DISCONTINUED | OUTPATIENT
Start: 2025-03-24 | End: 2025-03-24

## 2025-03-24 RX ORDER — FAMOTIDINE 10 MG/ML
INJECTION INTRAVENOUS AS NEEDED
Status: DISCONTINUED | OUTPATIENT
Start: 2025-03-24 | End: 2025-03-24

## 2025-03-24 RX ORDER — SODIUM CHLORIDE 0.9 G/100ML
INJECTION, SOLUTION IRRIGATION AS NEEDED
Status: DISCONTINUED | OUTPATIENT
Start: 2025-03-24 | End: 2025-03-24 | Stop reason: HOSPADM

## 2025-03-24 RX ORDER — CEFAZOLIN 1 G/1
INJECTION, POWDER, FOR SOLUTION INTRAVENOUS AS NEEDED
Status: DISCONTINUED | OUTPATIENT
Start: 2025-03-24 | End: 2025-03-24

## 2025-03-24 RX ORDER — KETOROLAC TROMETHAMINE 30 MG/ML
INJECTION, SOLUTION INTRAMUSCULAR; INTRAVENOUS AS NEEDED
Status: DISCONTINUED | OUTPATIENT
Start: 2025-03-24 | End: 2025-03-24

## 2025-03-24 RX ORDER — PROPOFOL 10 MG/ML
INJECTION, EMULSION INTRAVENOUS AS NEEDED
Status: DISCONTINUED | OUTPATIENT
Start: 2025-03-24 | End: 2025-03-24

## 2025-03-24 RX ORDER — IBUPROFEN 600 MG/1
600 TABLET ORAL EVERY 6 HOURS PRN
Qty: 50 TABLET | Refills: 0 | Status: SHIPPED | OUTPATIENT
Start: 2025-03-24

## 2025-03-24 RX ORDER — NORELGESTROMIN AND ETHINYL ESTRADIOL 35; 150 UG/MG; UG/MG
1 PATCH TRANSDERMAL WEEKLY
Qty: 12 PATCH | Refills: 3 | Status: SHIPPED | OUTPATIENT
Start: 2025-03-24 | End: 2026-03-24

## 2025-03-24 RX ORDER — NORELGESTROMIN AND ETHINYL ESTRADIOL 35; 150 UG/MG; UG/MG
PATCH TRANSDERMAL
Qty: 3 PATCH | Refills: 12 | Status: SHIPPED | OUTPATIENT
Start: 2025-03-24 | End: 2025-03-24

## 2025-03-24 RX ORDER — BUPIVACAINE HYDROCHLORIDE 5 MG/ML
INJECTION, SOLUTION PERINEURAL AS NEEDED
Status: DISCONTINUED | OUTPATIENT
Start: 2025-03-24 | End: 2025-03-24 | Stop reason: HOSPADM

## 2025-03-24 RX ADMIN — PROPOFOL 50 MG: 10 INJECTION, EMULSION INTRAVENOUS at 06:30

## 2025-03-24 RX ADMIN — HYDROMORPHONE HYDROCHLORIDE 0.2 MG: 1 INJECTION, SOLUTION INTRAMUSCULAR; INTRAVENOUS; SUBCUTANEOUS at 08:37

## 2025-03-24 RX ADMIN — MIDAZOLAM HYDROCHLORIDE 2 MG: 2 INJECTION, SOLUTION INTRAMUSCULAR; INTRAVENOUS at 06:28

## 2025-03-24 RX ADMIN — PROMETHAZINE HYDROCHLORIDE 6.25 MG: 25 INJECTION INTRAMUSCULAR; INTRAVENOUS at 10:27

## 2025-03-24 RX ADMIN — HYDROMORPHONE HYDROCHLORIDE 0.5 MG: 1 INJECTION, SOLUTION INTRAMUSCULAR; INTRAVENOUS; SUBCUTANEOUS at 07:49

## 2025-03-24 RX ADMIN — Medication 80 MCG: at 06:54

## 2025-03-24 RX ADMIN — ONDANSETRON 4 MG: 2 INJECTION INTRAMUSCULAR; INTRAVENOUS at 09:10

## 2025-03-24 RX ADMIN — ROCURONIUM 10 MG: 50 INJECTION, SOLUTION INTRAVENOUS at 07:13

## 2025-03-24 RX ADMIN — OXYCODONE 10 MG: 5 TABLET ORAL at 11:35

## 2025-03-24 RX ADMIN — SUGAMMADEX 200 MG: 100 INJECTION, SOLUTION INTRAVENOUS at 08:48

## 2025-03-24 RX ADMIN — ONDANSETRON 4 MG: 2 INJECTION, SOLUTION INTRAMUSCULAR; INTRAVENOUS at 08:26

## 2025-03-24 RX ADMIN — ONDANSETRON 4 MG: 2 INJECTION INTRAMUSCULAR; INTRAVENOUS at 04:29

## 2025-03-24 RX ADMIN — ROCURONIUM 40 MG: 50 INJECTION, SOLUTION INTRAVENOUS at 06:45

## 2025-03-24 RX ADMIN — ALBUMIN HUMAN 250 ML: 0.05 INJECTION, SOLUTION INTRAVENOUS at 07:21

## 2025-03-24 RX ADMIN — HYDROMORPHONE HYDROCHLORIDE 0.3 MG: 1 INJECTION, SOLUTION INTRAMUSCULAR; INTRAVENOUS; SUBCUTANEOUS at 08:41

## 2025-03-24 RX ADMIN — FAMOTIDINE 20 MG: 10 INJECTION, SOLUTION INTRAVENOUS at 06:24

## 2025-03-24 RX ADMIN — FENTANYL CITRATE 25 MCG: 50 INJECTION, SOLUTION INTRAMUSCULAR; INTRAVENOUS at 06:30

## 2025-03-24 RX ADMIN — HYDROMORPHONE HYDROCHLORIDE 0.5 MG: 1 INJECTION, SOLUTION INTRAMUSCULAR; INTRAVENOUS; SUBCUTANEOUS at 09:57

## 2025-03-24 RX ADMIN — FENTANYL CITRATE 25 MCG: 50 INJECTION, SOLUTION INTRAMUSCULAR; INTRAVENOUS at 06:24

## 2025-03-24 RX ADMIN — FENTANYL CITRATE 50 MCG: 50 INJECTION, SOLUTION INTRAMUSCULAR; INTRAVENOUS at 07:23

## 2025-03-24 RX ADMIN — DEXAMETHASONE SODIUM PHOSPHATE 8 MG: 4 INJECTION INTRA-ARTICULAR; INTRALESIONAL; INTRAMUSCULAR; INTRAVENOUS; SOFT TISSUE at 06:37

## 2025-03-24 RX ADMIN — Medication 6 L/MIN: at 09:00

## 2025-03-24 RX ADMIN — Medication 100 MG: at 06:30

## 2025-03-24 RX ADMIN — SODIUM CHLORIDE, SODIUM LACTATE, POTASSIUM CHLORIDE, AND CALCIUM CHLORIDE: 600; 310; 30; 20 INJECTION, SOLUTION INTRAVENOUS at 06:18

## 2025-03-24 RX ADMIN — KETOROLAC TROMETHAMINE 30 MG: 30 INJECTION, SOLUTION INTRAMUSCULAR; INTRAVENOUS at 08:26

## 2025-03-24 RX ADMIN — ONDANSETRON 4 MG: 2 INJECTION INTRAMUSCULAR; INTRAVENOUS at 05:03

## 2025-03-24 RX ADMIN — Medication 120 MCG: at 07:12

## 2025-03-24 RX ADMIN — PROPOFOL 50 MG: 10 INJECTION, EMULSION INTRAVENOUS at 06:33

## 2025-03-24 RX ADMIN — HYDROMORPHONE HYDROCHLORIDE 0.5 MG: 1 INJECTION, SOLUTION INTRAMUSCULAR; INTRAVENOUS; SUBCUTANEOUS at 09:15

## 2025-03-24 RX ADMIN — HYDROMORPHONE HYDROCHLORIDE 0.5 MG: 1 INJECTION, SOLUTION INTRAMUSCULAR; INTRAVENOUS; SUBCUTANEOUS at 04:29

## 2025-03-24 RX ADMIN — CEFAZOLIN 2 G: 1 INJECTION, POWDER, FOR SOLUTION INTRAMUSCULAR; INTRAVENOUS at 06:38

## 2025-03-24 RX ADMIN — ONDANSETRON HYDROCHLORIDE 4 MG: 2 INJECTION, SOLUTION INTRAVENOUS at 04:29

## 2025-03-24 ASSESSMENT — PAIN SCALES - GENERAL
PAINLEVEL_OUTOF10: 10 - WORST POSSIBLE PAIN
PAINLEVEL_OUTOF10: 6
PAIN_LEVEL: 2
PAINLEVEL_OUTOF10: 5 - MODERATE PAIN
PAINLEVEL_OUTOF10: 8
PAINLEVEL_OUTOF10: 5 - MODERATE PAIN
PAINLEVEL_OUTOF10: 6
PAINLEVEL_OUTOF10: 0 - NO PAIN
PAINLEVEL_OUTOF10: 5 - MODERATE PAIN
PAINLEVEL_OUTOF10: 5 - MODERATE PAIN
PAINLEVEL_OUTOF10: 10 - WORST POSSIBLE PAIN
PAINLEVEL_OUTOF10: 7
PAINLEVEL_OUTOF10: 10 - WORST POSSIBLE PAIN

## 2025-03-24 ASSESSMENT — PAIN - FUNCTIONAL ASSESSMENT
PAIN_FUNCTIONAL_ASSESSMENT: UNABLE TO SELF-REPORT
PAIN_FUNCTIONAL_ASSESSMENT: UNABLE TO SELF-REPORT
PAIN_FUNCTIONAL_ASSESSMENT: 0-10
PAIN_FUNCTIONAL_ASSESSMENT: UNABLE TO SELF-REPORT
PAIN_FUNCTIONAL_ASSESSMENT: 0-10
PAIN_FUNCTIONAL_ASSESSMENT: UNABLE TO SELF-REPORT
PAIN_FUNCTIONAL_ASSESSMENT: 0-10

## 2025-03-24 ASSESSMENT — PAIN DESCRIPTION - LOCATION
LOCATION: ABDOMEN

## 2025-03-24 ASSESSMENT — PAIN DESCRIPTION - PROGRESSION: CLINICAL_PROGRESSION: OTHER (COMMENT)

## 2025-03-24 ASSESSMENT — PAIN DESCRIPTION - DESCRIPTORS: DESCRIPTORS: CRAMPING;BURNING

## 2025-03-24 NOTE — ANESTHESIA PREPROCEDURE EVALUATION
Patient: Dot Casey    Procedure Information       Date/Time: 03/24/25 3423    Procedure: LAPAROSCOPY, EXPLORATORY - diagnostic laparoscopy, surgical managment of ectopic pregnancy, possible salpingectomy, possible oophorectomy, any indicated procedures    Location: [facility] OR 04 / Virtual [facility] OR    Surgeons: Anuel Ingram MD            Relevant Problems   Anesthesia (within normal limits)      Cardiac (within normal limits)      Pulmonary (within normal limits)      Neuro   (+) Postpartum depression      GI   (+) Gastroesophageal reflux disease      Endocrine   (+) Goiter      Hematology   (+) ABLA (acute blood loss anemia)   (+) Anemia      GYN   (+) 34 weeks gestation of pregnancy ([facility])   (+) Ruptured ectopic pregnancy ([facility])       Clinical information reviewed:    Allergies                NPO Detail:  No data recorded     Physical Exam    Airway  Mallampati: II  TM distance: >3 FB  Neck ROM: full     Cardiovascular   Rhythm: regular  Rate: normal     Dental    Pulmonary    Abdominal          Anesthesia Plan    History of general anesthesia?: yes  History of complications of general anesthesia?: no    ASA 2 - emergent     general     intravenous induction   Postoperative administration of opioids is intended.  Trial extubation is planned.  Anesthetic plan and risks discussed with patient.  Use of blood products discussed with patient who consented to blood products.    Plan discussed with attending.

## 2025-03-24 NOTE — ANESTHESIA PROCEDURE NOTES
Airway  Date/Time: 3/24/2025 6:31 AM  Urgency: elective    Airway not difficult    Staffing  Performed: resident   Authorized by: Adraino Moreira MD    Performed by: Rosa Raines MD  Patient location during procedure: OR    Indications and Patient Condition  Indications for airway management: anesthesia  Spontaneous Ventilation: absent  Sedation level: deep  Preoxygenated: yes  Patient position: sniffing  Mask difficulty assessment: 0 - not attempted  Planned trial extubation    Final Airway Details  Final airway type: endotracheal airway      Successful airway: ETT  Cuffed: yes   Successful intubation technique: direct laryngoscopy  Facilitating devices/methods: intubating stylet  Endotracheal tube insertion site: oral  Blade: [place]  Blade size: #3  ETT size (mm): 7.0  Cormack-Lehane Classification: grade I - full view of glottis  Placement verified by: chest auscultation and capnometry   Inital cuff pressure (cm H2O): 5  Measured from: lips  ETT to lips (cm): 22  Number of attempts at approach: 1

## 2025-03-24 NOTE — H&P
History Of Present Illness  Dot Casey is a 24 y.o.  presenting as transfer from [facility] for c/f ectopic pregnancy. Patient had sudden onset sharp pain around midnight. She got up to go get a drink of water and she felt dizzy and was sweating. She laid back down and when she stood up again, she was dizzy. Called EMS. Had episode of emesis.    Patient reports normal period in February (does not remember the date) and then had spotting -. Unplanned pregnancy.     Past Medical History  She has a past medical history of Gonorrhea, Nontoxic goiter, unspecified (2018), Unspecified ovarian cyst, unspecified side (2018), and Urogenital trichomoniasis.    She has no past medical history of Abnormal Pap smear of cervix, Asthma ([facility]), Diabetes mellitus (Multi), or Hypertension.    Surgical History  RSO  Surgical removal of tubal remnant ectopic  R cornual wedge resection  C-sectionx1      OB History  OB History    Para Term  AB Living   7 3 2 1 4 2   SAB IAB Ectopic Multiple Live Births   2   2 0 3      # Outcome Date GA Lbr Brent/2nd Weight Sex Type Anes PTL Lv   7  03/15/24 36w0d  2.555 kg F CS-LTranv CSE  JACINTA   6 Ectopic 23           5 Ectopic 2022           4 SAB 2020           3 Term 18 39w3d  3.232 kg F Vag-Spont  N JACINTA   2 SAB 2018           1 Term 16 39w6d  3.005 kg F Vag-Spont EPI N DEC       Sexual History  Social History     Substance and Sexual Activity   Sexual Activity Yes    Partners: Male        Social History  She reports that she has quit smoking. Her smoking use included cigarettes. She has never used smokeless tobacco. She reports that she does not currently use alcohol. She reports that she does not currently use drugs after having used the following drugs: Marijuana.    Family History  Family History   Problem Relation Name Age of Onset    Hypertension Mother      No Known Problems Father      Diabetes Maternal  Grandmother      No Known Problems Sibling      Breast cancer Neg Hx      Cervical cancer Neg Hx      Colon cancer Neg Hx      Pancreatic cancer Neg Hx          Allergies  Patient has no known allergies.    Review of Systems   All other systems reviewed and are negative.       Physical Exam  Vitals reviewed.     General: no acute distress  HEENT: normocephalic, atraumatic  Heart: warm and well perfused  Lungs: no excessive respiratory effort  Abdomen: tender in bilateral lower quadrants. Rebound tenderness present  Extremities: moving all extremities  Neuro: awake and conversant  Psych: appropriate mood and affect  BSUS: gestational sac visualized at top of uterus. Does not appear to be continuous with endometrial stripe. Free fluid present.    Last Recorded Vitals  Blood pressure 98/67, pulse 75, temperature 37.4 °C (99.4 °F), temperature source Temporal, resp. rate (!) 23, height 1.524 m (5'), weight 61.7 kg (136 lb), SpO2 97%, not currently breastfeeding.    Relevant Results  Medications  No current facility-administered medications for this encounter.    Current Outpatient Medications:     lidocaine (XYLOCAINE) 4 % gel gel, Apply 1 Application topically if needed for pain., Disp: 113 g, Rfl: 0    norgestimate-ethinyl estradioL (Ortho-Cyclen) 0.25-35 mg-mcg tablet, Take 1 tablet by mouth once daily. You may skip the placebo week (week 4) and begin taking the following pack if you would like to skip your period. Every 3 months, take the placebo week to have a period and reduce the likelihood of spotting., Disp: 84 tablet, Rfl: 3    Labs  CBC  WBC   Date Value Ref Range Status   03/24/2025 22.3 (H) 4.4 - 11.3 x10*3/uL Final     nRBC   Date Value Ref Range Status   03/24/2025 0.0 0.0 - 0.0 /100 WBCs Final     RBC   Date Value Ref Range Status   03/24/2025 3.13 (L) 4.00 - 5.20 x10*6/uL Final     Hemoglobin   Date Value Ref Range Status   03/24/2025 9.1 (L) 12.0 - 16.0 g/dL Final     Hematocrit   Date Value Ref Range  Status   2025 26.2 (L) 36.0 - 46.0 % Final     MCV   Date Value Ref Range Status   2025 84 80 - 100 fL Final     MCH   Date Value Ref Range Status   2025 29.1 26.0 - 34.0 pg Final     MCHC   Date Value Ref Range Status   2025 34.7 32.0 - 36.0 g/dL Final     RDW   Date Value Ref Range Status   2025 14.4 11.5 - 14.5 % Final     Platelets   Date Value Ref Range Status   2025 167 150 - 450 x10*3/uL Final              Assessment/Plan     24 y.o.  presenting as transfer from [facility] e[facility]d for c/f ruptured ectopic pregnancy    C/f ruptured ectopic pregnancy  - overall presentation concerning ruptured ectopic pregnancy given location of GS on BSUS, peritonitis, and drop in hgb  - discussed recommendation for surgical evaluation - diagnostic laparoscopy, surgical management of ectopic pregnancy, poss salpingectomy, poss oophorectomy, any indicated procedures  - discussed that if other fallopian tube removed, patient would require KATIA for future pregnancies  - interested in patch for contraception  - T&S ordered, T&C 2 units pRBCs  - case request placed and consents signed  - proceed to OR for above procedure    Seen and discussed with Dr. Yvonne Garcia MD PGY-2

## 2025-03-24 NOTE — ED PROCEDURE NOTE
Procedure  Critical Care    Performed by: Adin Parnell MD  Authorized by: Adin Parnell MD    Critical care provider statement:     Critical care time (minutes):  32  Comments:      Critical Care Time  Authorized and Performed by: Adin Parnell MD  Total critical care time: [32] minutes  Due to a high probability of clinically significant, life threatening deterioration, the patient required my highest level of preparedness to intervene emergently and I personally spent this critical care time directly and personally managing the patient. This critical care time included obtaining a history; examining the patient; pulse oximetry; ordering and review of studies; arranging urgent treatment with development of a management plan; evaluation of patient's response to treatment; frequent reassessment; and, discussions with other providers and patient/family.  This critical care time was performed to assess and manage the high probability of imminent, life-threatening deterioration that could result in multi-organ failure. It was exclusive of separately billable procedures and treating other patients and teaching time.  Please see MDM section and the rest of the note for further information on patient assessment and treatment.             Adin Parnell MD  03/24/25 0447

## 2025-03-24 NOTE — ANESTHESIA POSTPROCEDURE EVALUATION
Patient: Dot Casey    Procedure Summary       Date: 03/24/25 Room / Location: [facility] OR 04 / Virtual [facility] OR    Anesthesia Start: 0618 Anesthesia Stop: 0905    Procedure: Diagnostic Laparoscopy, Corneal Wedge Resection. Diagnosis:       Ruptured ectopic pregnancy ([facility])      (Ruptured ectopic pregnancy ([facility]) [O00.90])    Surgeons: Anuel Ingram MD Responsible Provider: Bethany Iyer MD    Anesthesia Type: general ASA Status: 2 - Emergent            Anesthesia Type: general    Vitals Value Taken Time   /87 03/24/25 0906   Temp 36.2 03/24/25 0906   Pulse 107 03/24/25 0859   Resp 28 03/24/25 0859   SpO2 100 % 03/24/25 0859   Vitals shown include unfiled device data.    Anesthesia Post Evaluation    Patient location during evaluation: PACU  Patient participation: complete - patient participated  Level of consciousness: sleepy but conscious  Pain score: 2  Pain management: adequate  Airway patency: patent  Cardiovascular status: stable, acceptable and blood pressure returned to baseline  Respiratory status: spontaneous ventilation, acceptable and face mask  Hydration status: acceptable  Postoperative Nausea and Vomiting: none        No notable events documented.

## 2025-03-24 NOTE — ED TRIAGE NOTES
Patient transferred from OSH for c/o ectopic pregnancy. . Patient initially presented to OSH for abdominal pain and N/V. US showed free fluid. Per transport, patient received 1L IV fluids and 50 mcg fentanyl. Patient has PMHx of ectopic pregnancy; this instance being the third.

## 2025-03-24 NOTE — DISCHARGE INSTRUCTIONS
Laparoscopic Surgery Discharge Instructions  If you have any questions about your care, please contact us at [phone].    Please get your labs drawn next Monday (3/31) to make sure your hcg (pregnancy hormone) is decreasing appropriately. You can get this drawn at any  lab.    Post-Operation Instructions  What care is needed at home?  Ask your doctor what you need to do when you go home. Make sure you ask questions if you do not understand what you need to do.  You can take off the bandaid covering your incision in 1-2 days.  You can shower, but do not scrub your incisions, let warm soapy water flow over them.  Do not lift things over 10 pounds (4.5 kg).  Be sure to wash your hands before and after touching your wound or dressing.  Your bowel movements may take some time to get back to normal. Eat small meals high in fiber to avoid hard stools. Drink 6 to 8 glasses of water each day.  Try to walk each day. Start by walking a little more than you did the day before. Walking boosts blood flow and helps prevent lung, belly, and blood problems.    What follow-up care is needed?  We will call you to schedule a follow up visit with your surgeon. Be sure to keep your visits.  You have stitches. They will dissolve on their own. The surgical glue will fall off on its own as well.    What drugs may be needed?  We are sending you home with ibuprofen, tylenol, oxycodone (for pain), and Miralax (a stool softener).     What problems could happen?  Infection  Wound opening  Heavy blood loss  Blood clots in your legs or lungs  Damage to your bowel, bladder, and other organs inside the belly  Trouble passing bowel movements    When do I need to call the doctor?  Signs of infection such as a fever of 100.4°F (38°C) or higher, chills, pain with passing urine.  Signs of wound infection such as swelling, redness, warmth around the wound; too much pain when touched; yellowish, greenish, or bloody discharge; foul smell coming  from the cut site; cut site opens up.  Excessive blood in your sanitary pads or more than six soaked pads in a day. (Spotting is normal).  Smelly green or dark yellow vaginal discharge  Upset stomach, throwing up, or very bad belly pain  Pain not helped by drugs you are taking  No bowel movement after 3 days  If you feel the need to pass urine but urine will not come out even after 6 hours  Swelling in your leg or arm that is much greater on one side than the other

## 2025-03-24 NOTE — ED PROVIDER NOTES
CC: Ectopic Pregnancy     HPI:  Patient is a 24-year-old female -1-4-2 who presented to the ED as a transfer from [facility] for concern for ruptured ectopic pregnancy.  Patient noted to have sudden onset Lower abdominal pain that began approximately midnight.  At OSH, patient had a positive fast.  Patient administered analgesia.  Patient also administered IV fluids.  Patient transferred to [facility] for GYN evaluation.  Upon [facility] ED presentation, patient continues to note significant abdominal pain.  Patient noted have a history of ectopic pregnancies.  Denied trauma, falls, chest pain, difficulty breathing, headache, back pain, fevers, and chills.    Limitations to history: None  Independent historian(s): Patient  Records Reviewed: Recent available ED and inpatient notes reviewed in EMR.    PMHx/PSHx:  Per HPI.   - has a past medical history of Gonorrhea, Nontoxic goiter, unspecified (2018), Unspecified ovarian cyst, unspecified side (2018), and Urogenital trichomoniasis.  - has a past surgical history that includes Ovarian cyst removal (); Laparoscopy diagnostic / biopsy / aspiration / lysis (2022); Salpingectomy (Right, 2018); and Ectopic pregnancy surgery (Right, 2023).    Medications:  Reviewed in EMR. See EMR for complete list of medications and doses.    Allergies:  Patient has no known allergies.    Social History:  - Tobacco:  reports that she has quit smoking. Her smoking use included cigarettes. She has never used smokeless tobacco.   - Alcohol:  reports that she does not currently use alcohol.   - Illicit Drugs:  reports that she does not currently use drugs after having used the following drugs: Marijuana.     ROS:  Per HPI.       ???????????????????????????????????????????????????????????????  Triage Vitals:  T 37.4 °C (99.4 °F)  HR 80  /58  RR 18  O2 100 %      Physical Exam  Vitals and nursing note reviewed.   Constitutional:       General: She is not  in acute distress.  HENT:      Head: Normocephalic and atraumatic.      Nose: Nose normal.      Mouth/Throat:      Mouth: Mucous membranes are moist.   Eyes:      Conjunctiva/sclera: Conjunctivae normal.   Cardiovascular:      Rate and Rhythm: Normal rate and regular rhythm.      Pulses: Normal pulses.   Pulmonary:      Effort: Pulmonary effort is normal. No respiratory distress.      Breath sounds: Normal breath sounds.   Abdominal:      Palpations: Abdomen is soft.      Comments: Lower abdominal TTP.  No rebound, guarding, or rigidity.   Skin:     General: Skin is warm.   Neurological:      General: No focal deficit present.      Mental Status: She is alert.         ???????????????????????????????????????????????????????????????  Labs:   Labs Reviewed   CBC WITH AUTO DIFFERENTIAL   BASIC METABOLIC PANEL   PROTIME-INR   APTT   TYPE AND SCREEN   HUMAN CHORIONIC GONADOTROPIN, SERUM QUANTITATIVE   FIBRINOGEN        Imaging:   No orders to display        MDM:  Patient is a 24-year-old female -1-4-2 who presented to the ED as a transfer from [facility] for concern for ruptured ectopic pregnancy.  Patient presented HDS.  Low clinical concern for acute infectious process, metabolic process, traumatic process, aortic process, perforation, SBO, mesenteric ischemia.  Not in shock.  Patient administered analgesia and antiemetic.  OB was consulted.  Preop labs, fibrinogen, and beta quant obtained.  Fast to be positive in the pelvis as well as the left upper quadrant.  OB evaluated the patient and noted they will take her up to the OR.    ED Course:  ED Course as of 25 0845   Mon Mar 24, 2025   0521 CBC and Auto Differential(!)  CBC with leukocytosis that is likely reactive and chronic anemia. [MH]   0539 Bedside fast significant for free fluid of the pelvis as well as of the left upper quadrant. [MH]   0551 Basic metabolic panel(!)  Metabolic panel unremarkable. [MH]      ED Course User Index  [MH] Td Ortiz MD          Diagnoses as of 25 0845   Ruptured ectopic pregnancy ([facility])       Social Determinants Limiting Care:  None identified    Disposition:  Send to OR    Td Ortiz MD   Emergency Medicine PGY-3  [facility]    Comment: Please note this report has been produced using speech recognition software and may contain errors related to that system including errors in grammar, punctuation, and spelling as well as words and phrases that may be inappropriate.  If there are any questions or concerns please feel free to contact the dictating provider for clarification.    Procedures ? SmartLinks last updated 3/24/2025 4:35 AM     ---------------------------------------------------------    ATTENDING NOTE for Adin Parnell MD:    ATTENDING ATTESTATION:  The patient was seen by the resident/fellow.  I have personally performed a substantive portion of the encounter.  I have seen and examined the patient; agree with the workup, evaluation, MDM, management and diagnosis.  The care plan has been discussed with the resident/fellow; I have reviewed the resident/fellow´s note and agree with the documented findings with the exception/addition of the followin-year-old who is a G8, P3 who has had 2 previous ectopic pregnancies requiring emergent surgery who was transferred here from [facility] after she presented with abdominal pain and lightheadedness and had a positive pregnancy test and a bedside ultrasound which showed free fluid concerning for ruptured ectopic pregnancy.  Patient was sent here with plan to receive additional resuscitation in the ER and then be sent directly to the operating room for definitive intervention.  She had received 1 L of fluids at referring facility but currently has a reassuring blood pressure and heart rate.  She has good vascular access and blood work was obtained and she was given 0.5 mg of IV Dilaudid.  OB was consulted who came to the bedside.   Patient was monitored closely until she can be taken to the OR.    ---------------------------------------------------------     Td Ortiz MD  Resident  03/26/25 1009

## 2025-03-24 NOTE — LETTER
March 24, 2025     Patient: Dot Casey   YOB: 2000   Date of Visit: 3/24/2025       To Whom It May Concern:    Dot Casey underwent emergency surgery on 3/24/2025. She should remain off of work for 1 week. Please excuse Dot for her absence from work due to this medical emergency.    If you have any questions or concerns, please don't hesitate to call [phone].       Sincerely,     Cinda Pereira MD

## 2025-03-24 NOTE — OP NOTE
Date: 3/24/2025  OR Location: [facility] OR    Name: Dot Casey, : 2000, Age: 24 y.o., MRN: [id number], Sex: female    Diagnosis  Pre-op Diagnosis      * Ruptured ectopic pregnancy ([facility]) [O00.90] Post-op Diagnosis     * Ruptured ectopic pregnancy ([facility]) [O00.90]     Procedures  Diagnostic Laparoscopy, Corneal Wedge Resection.  17084 - IL LAPS ABD PRTM&OMENTUM DX W/WO SPEC BR/WA SPX      Surgeons   Dr Rowena Cardozo - Attending - Primary surgeon    Resident/Fellow/Other Assistant:  Surgeons and Role:     * Cinda Pereira MD - Resident - Assisting     * Caitlin Smith MD - Resident - Assisting     * Carmina Garcia MD - Resident - Assisting    Staff:   Circulator: [place] New[place] Person: Awa  Circulator: Lear  Relief Circulator: Milagros  Relief Circulator: Jyoti    Anesthesia Staff: Anesthesiologist: Bethany Iyer MD; Adriano Moreira MD  Anesthesia Resident: Robert Gomez MD; Rosa Raines MD; Abdifatah Harvey MD    Procedure Summary  Anesthesia: General  ASA: II  Estimated Blood Loss: 700 mL  hemoperitoneum evacuated, 25 ml intraop  Intra-op Medications:   Administrations occurring from 0440 to 0645 on 25:   Medication Name Total Dose   ceFAZolin (Ancef) vial 1 g 2 g   dexAMETHasone (Decadron) 4 mg/mL 8 mg   famotidine (Pepcid) 10 mg/mL 20 mg   fentaNYL (Sublimaze) injection 50 mcg/mL 50 mcg   ketamine (Ketalar) 10 mg/mL injection 100 mg   midazolam PF (Versed) injection 1 mg/mL 2 mg   propofol (Diprivan) injection 10 mg/mL 100 mg   rocuronium (ZeMuron) 50 mg/5 mL injection 40 mg   ondansetron (Zofran) injection 4 mg 4 mg              Anesthesia Record               Intraprocedure I/O Totals          Intake    Ketamine 0.00 mL    The total shown is the total volume documented since Anesthesia Start was filed.    Total Intake 0 mL       Output    Urine 205 mL    Est. Blood Loss 700 mL    Total Output 905 mL       Net    Net Volume -905 mL          Specimen:   ID Type Source Tests  Collected by Time   1 : Ectopic Pregnancy Tissue ECTOPIC PRODUCTS OF CONCEPTION (SPECIFY LOCATION) SURGICAL PATHOLOGY EXAM Rowena Cardozo MD 3/24/2025 0891                 Procedure Details:  The patient was seen in the preoperative area. The site of surgery was properly noted/marked if necessary per policy. The patient has been actively warmed in preoperative area. Preoperative antibiotics have been ordered and given within 1 hours of incision. Venous thrombosis prophylaxis have been ordered including bilateral sequential compression devices    Findings: Right cornual ectopic pregnancy, partially protruded through R cornua. Large volume hemoperitoneum. Normal appearing left fallopian tube and ovary. Dense adhesions between anterior lower uterine segment and anterior abdominal wall.    After an informed consent was obtained, the patient was taken to the operating room and the general anesthetic was administered. She was then positioned in the dorsal lithotomy position and prepped and draped in the normal sterile fashion. A cortés catheter was placed.    Next, attention was then turned to the abdomen. A 1 cm vertical incision was made just superior to the umbilicus. The fascia was then entered via open Pearce technique. A 10 mm trocar port was placed and connected to the CO2 gas. The abdomen was insufflated to an adequate distension. The camera was inserted and confirmed that we were in the abdominal cavity. Initial survey of the abdomen found it to be devoid of trauma from entry. Two additional 5 mm trocar ports were placed in the LLQ and RLQ under direct visualization. An additional 5 mm trocar was placed on the right side above the initial RLQ port.    Abdominal survey noteable for findings as above.Large volume hemoperitoneum was evacuated. The cornual ectopic pregnancy was visualized partially protruding from the R cornua. There was minimal active bleeding noted at this time.    30 ml of 1% lidocaine with  epinephrine was injected circumferentially around the ectopic pregnancy. Monopolar and bipolar cautery were then used to carefully wedge off the pregnancy tissue from the uterus. The endometrial cavity was entered in this process and Ancef given. The uterine cornua was then closed in 2 layers with 0-V lock suture. Surgicel powder was applied. Good hemostasis was noted.     We then switched from the 10mm scope to a 5mm scope and inserted a 10 mm Endocatch bag. The pregnancy tissue was placed in the bag along with organized clot. The bag was removed from the abdomen. The abdomen was irrigated with good hemostasis observed. All ports were removed, the CO2 gas was disconnected, and the abdomen was desufflated. All laparoscopic incisions were closed with a 4-0 Vicryl in a subcuticular fashion. Steri-strips were placed. At the end of the procedure, the uterine manipulator and Clifton were removed from the cervix and urethra respectively, and the patient was taken to recovery in stable condition. The patient tolerated the procedure well. Sponge, lap, and needle counts were correct x2.     Plan for labs in PACU given starting Hgb 9.1.    Complications:  None; patient tolerated the procedure well.     Disposition: PACU - hemodynamically stable.  Condition: stable

## 2025-03-24 NOTE — ANESTHESIA PROCEDURE NOTES
Peripheral IV  Date/Time: 3/24/2025 6:34 AM      Placement  Needle size: 18 G  Laterality: left  Location: hand  Site prep: alcohol  Technique: anatomical landmarks

## 2025-03-27 ENCOUNTER — TELEPHONE (OUTPATIENT)
Dept: CASE MANAGEMENT | Facility: HOSPITAL | Age: 25
End: 2025-03-27
Payer: MEDICAID

## 2025-03-27 NOTE — TELEPHONE ENCOUNTER
3/27/25: Received referral from Dr. Lazaro for bereavement follow up for Dot Casey. Called pt at [phone]. Was unable to reach pt. Left voicemail with our direct contact information.     Will remain available for bereavement support/resources.    Alyce Rivers  MSW Intern  [facility]  [phone]

## 2025-03-28 LAB
BLOOD EXPIRATION DATE: NORMAL
BLOOD EXPIRATION DATE: NORMAL
DISPENSE STATUS: NORMAL
DISPENSE STATUS: NORMAL
PRODUCT BLOOD TYPE: 5100
PRODUCT BLOOD TYPE: 5100
PRODUCT CODE: NORMAL
PRODUCT CODE: NORMAL
UNIT ABO: NORMAL
UNIT ABO: NORMAL
UNIT NUMBER: NORMAL
UNIT NUMBER: NORMAL
UNIT RH: NORMAL
UNIT RH: NORMAL
UNIT VOLUME: 279
UNIT VOLUME: 350
XM INTEP: NORMAL
XM INTEP: NORMAL

## 2025-05-02 ENCOUNTER — TELEPHONE (OUTPATIENT)
Dept: OBSTETRICS AND GYNECOLOGY | Facility: CLINIC | Age: 25
End: 2025-05-02
Payer: MEDICAID

## 2025-05-02 NOTE — TELEPHONE ENCOUNTER
Returned certified letter for pt needs beta level done post ectopic  - letter to scan as undelivered and rn attempted to reach pt by phone and number is not allowing a call

## (undated) DEVICE — Device

## (undated) DEVICE — LIGASURE, V SEALER/DIVIDER  5MM BLUNT TIP

## (undated) DEVICE — PREP TRAY, SKIN, DRY, W/GLOVES

## (undated) DEVICE — ELECTRODE, OPTI2 LAPAROSCOPIC SPATULA, CURVED

## (undated) DEVICE — TROCAR SYSTEM, BALLOON, KII GELPORT, 12 X 100MM

## (undated) DEVICE — TOWEL PACK, STERILE, 4/PACK, BLUE

## (undated) DEVICE — SYRINGE, 20 CC, LUER LOCK, MONOJECT, W/O CAP, LF

## (undated) DEVICE — HEMOSTAT, SURGICEL POWDER 3.0GRAMS

## (undated) DEVICE — TRAP, SPECIMEN, 40 ML

## (undated) DEVICE — SOLUTION, INJECTION, STERILE WATER, 10 ML, VIAL

## (undated) DEVICE — SUTURE, MONOCRYL, 4-0, 18 IN, PS2, UNDYED

## (undated) DEVICE — RETRIEVAL SYSTEM, MONARCH, 10MM DISP ENDOSCOPIC

## (undated) DEVICE — COVER, CART, 45 X 27 X 48 IN, CLEAR

## (undated) DEVICE — MANIFOLD, 4 PORT NEPTUNE STANDARD

## (undated) DEVICE — APPLICATOR, ENDOSCOPIC, F/SURGICEL POWDER

## (undated) DEVICE — APPLICATOR, CHLORAPREP, W/ORANGE TINT, 26ML

## (undated) DEVICE — TUBE, SALEM SUMP, 16 FR X 48IN, ENFIT

## (undated) DEVICE — SYRINGE, 10 CC, SLIP TIP

## (undated) DEVICE — SUTURE, VICRYL, 0, 27 IN, UR-6, VIOLET

## (undated) DEVICE — RETRACTOR, CERVICAL CUP, VCARE, STANDARD

## (undated) DEVICE — TROCAR, KII OPTICAL BLADELESS 5MM Z THREAD 100MM LNGTH

## (undated) DEVICE — DRAPE PACK, CESAREAN SECTION, CUSTOM, UHC

## (undated) DEVICE — DRESSING, ADHESIVE, ISLAND, TELFA, 2 X 3.75 IN, LF

## (undated) DEVICE — TOWEL, SURGICAL, NEURO, O/R, 16 X 26, BLUE, STERILE

## (undated) DEVICE — HOLSTER, JET SAFETY